# Patient Record
Sex: FEMALE | Race: WHITE | NOT HISPANIC OR LATINO | Employment: FULL TIME | URBAN - METROPOLITAN AREA
[De-identification: names, ages, dates, MRNs, and addresses within clinical notes are randomized per-mention and may not be internally consistent; named-entity substitution may affect disease eponyms.]

---

## 2017-01-10 ENCOUNTER — OFFICE VISIT (OUTPATIENT)
Dept: SURGERY | Facility: CLINIC | Age: 42
End: 2017-01-10
Payer: COMMERCIAL

## 2017-01-10 VITALS
HEART RATE: 69 BPM | TEMPERATURE: 97 F | WEIGHT: 160.06 LBS | DIASTOLIC BLOOD PRESSURE: 79 MMHG | SYSTOLIC BLOOD PRESSURE: 135 MMHG

## 2017-01-10 DIAGNOSIS — K64.5 HEMORRHOIDS, EXTERNAL, THROMBOSED: Primary | ICD-10-CM

## 2017-01-10 PROCEDURE — 99243 OFF/OP CNSLTJ NEW/EST LOW 30: CPT | Mod: S$GLB,,, | Performed by: SURGERY

## 2017-01-10 PROCEDURE — 99999 PR PBB SHADOW E&M-NEW PATIENT-LVL II: CPT | Mod: PBBFAC,,, | Performed by: SURGERY

## 2017-01-10 RX ORDER — BUTALBITAL, ACETAMINOPHEN AND CAFFEINE 50; 325; 40 MG/1; MG/1; MG/1
TABLET ORAL
COMMUNITY
Start: 2016-12-22 | End: 2017-10-10 | Stop reason: SDUPTHER

## 2017-01-10 NOTE — LETTER
January 10, 2017      Brandyn White MD  [address]  [address]  [facility] [address]           [facility]  [address]  [facility] [address]  Phone: [phone]          Patient: Genet Martinez   MR Number: [id number]   YOB: 1975   Date of Visit: 1/10/2017       Dear Dr. Brandyn White:    Thank you for referring Genet Martinez to me for evaluation. Attached you will find relevant portions of my assessment and plan of care.    If you have questions, please do not hesitate to call me. I look forward to following Genet Martinez along with you.    Sincerely,    Sekou Smallwood MD    Enclosure  CC:  No Recipients    If you would like to receive this communication electronically, please contact [email or web address] or [phone] to request more information on [employer] access.    For providers and/or their staff who would like to refer a patient to [facility], please contact us through our one-stop-shop provider referral line, [facility], at [phone].    If you feel you have received this communication in error or would no longer like to receive these types of communications, please e-mail [email or web address]

## 2017-01-10 NOTE — PROGRESS NOTES
Subjective:       Patient ID: Genet Martinez is a 41 y.o. female.    Chief Complaint: Consult (external hemorrhoid)    HPI  Pleasant 40 yo F referred tome in consultation from Dr White for evaluation of hemorhroids.  Pt notes that on CHristmas lyubov she developed a swelling in the perianal area that was extremely painful>  Since that time the swelling has improved as has the tenderness.  She still feels a small nodule and notes some discomfort.  Denies bleeding. No fever/chills. NO abd pain. H as never had this happen before.     Review of Systems   Constitutional: Negative for activity change, appetite change, fever and unexpected weight change.   Respiratory: Negative for chest tightness, shortness of breath and wheezing.    Cardiovascular: Negative for chest pain.   Gastrointestinal: Positive for rectal pain. Negative for abdominal distention, abdominal pain, anal bleeding, blood in stool, constipation, diarrhea, nausea and vomiting.   Genitourinary: Negative for difficulty urinating, dysuria and frequency.   Skin: Negative for wound.   Neurological: Negative for dizziness.   Hematological: Negative for adenopathy.   Psychiatric/Behavioral: Negative for agitation.       Objective:      Physical Exam   Constitutional: She is oriented to person, place, and time. She appears well-developed and well-nourished.   HENT:   Head: Normocephalic and atraumatic.   Eyes: Pupils are equal, round, and reactive to light.   Neck: Normal range of motion. Neck supple. No tracheal deviation present. No thyromegaly present.   Cardiovascular: Normal rate, regular rhythm and normal heart sounds.    No murmur heard.  Pulmonary/Chest: Effort normal and breath sounds normal. She exhibits no tenderness.   Abdominal: Soft. Bowel sounds are normal. She exhibits no distension, no abdominal bruit, no pulsatile midline mass and no mass. There is no hepatosplenomegaly. There is no tenderness. There is no rigidity, no rebound, no guarding,  no tenderness at McBurney's point and negative Biggs's sign. No hernia. Hernia confirmed negative in the ventral area.   Genitourinary: Rectum normal.   Genitourinary Comments: Resolving thrombosis in the R post position     Musculoskeletal: Normal range of motion.   Neurological: She is alert and oriented to person, place, and time.   Skin: Skin is warm. No rash noted. No erythema.   Psychiatric: She has a normal mood and affect.   Vitals reviewed.      Assessment:     Resolving thrombosed hemorrhoid  No diagnosis found.    Plan:       D/w pt. I have informed pt that the hemorrhoid was thrombosed but appears nearly fully resolved.  Would not recommend any intervention at this point.  Instructed pt to RTC should it recur.      Correspondance will be sent to Brandyn White MD

## 2017-06-06 RX ORDER — VORTIOXETINE 10 MG/1
TABLET, FILM COATED ORAL
Qty: 30 TABLET | Refills: 3 | Status: SHIPPED | OUTPATIENT
Start: 2017-06-06 | End: 2017-11-27 | Stop reason: SDUPTHER

## 2017-07-24 ENCOUNTER — TELEPHONE (OUTPATIENT)
Dept: FAMILY MEDICINE | Facility: CLINIC | Age: 42
End: 2017-07-24

## 2017-07-24 RX ORDER — AMITRIPTYLINE HYDROCHLORIDE 10 MG/1
10 TABLET, FILM COATED ORAL NIGHTLY
Qty: 30 TABLET | Refills: 11 | Status: SHIPPED | OUTPATIENT
Start: 2017-07-24 | End: 2018-03-21 | Stop reason: SDUPTHER

## 2017-08-02 ENCOUNTER — TELEPHONE (OUTPATIENT)
Dept: FAMILY MEDICINE | Facility: CLINIC | Age: 42
End: 2017-08-02

## 2017-08-02 NOTE — TELEPHONE ENCOUNTER
Pt was taking Buspirone but stopped due to restless legs so she is taking Elavil and still has a lot of anxiety. Can she restart Trintellix or do you want her to try something else?

## 2017-08-02 NOTE — TELEPHONE ENCOUNTER
She has been on several meds in the past.  Restarting the Trintellix is fine if her insurance will cover it.  Does she need a new rx?  I am also Ok with restarting prozac or lexapro or celexa as well.

## 2017-10-10 RX ORDER — BUTALBITAL, ACETAMINOPHEN AND CAFFEINE 50; 325; 40 MG/1; MG/1; MG/1
TABLET ORAL
Qty: 30 TABLET | Refills: 0 | Status: SHIPPED | OUTPATIENT
Start: 2017-10-10 | End: 2017-10-16 | Stop reason: SDUPTHER

## 2017-10-16 RX ORDER — BUTALBITAL, ACETAMINOPHEN AND CAFFEINE 50; 325; 40 MG/1; MG/1; MG/1
TABLET ORAL
Qty: 30 TABLET | Refills: 0 | Status: SHIPPED | OUTPATIENT
Start: 2017-10-16 | End: 2018-03-21 | Stop reason: SDUPTHER

## 2017-11-06 DIAGNOSIS — K21.9 GASTROESOPHAGEAL REFLUX DISEASE, ESOPHAGITIS PRESENCE NOT SPECIFIED: Primary | ICD-10-CM

## 2017-11-06 RX ORDER — PANTOPRAZOLE SODIUM 40 MG/1
1 TABLET, DELAYED RELEASE ORAL DAILY
COMMUNITY
Start: 2017-03-20 | End: 2017-11-06 | Stop reason: SDUPTHER

## 2017-11-06 RX ORDER — PANTOPRAZOLE SODIUM 40 MG/1
40 TABLET, DELAYED RELEASE ORAL DAILY
Qty: 30 TABLET | Refills: 0 | Status: SHIPPED | OUTPATIENT
Start: 2017-11-06 | End: 2017-12-05 | Stop reason: SDUPTHER

## 2017-11-27 RX ORDER — VORTIOXETINE 10 MG/1
TABLET, FILM COATED ORAL
Qty: 30 TABLET | Refills: 2 | Status: SHIPPED | OUTPATIENT
Start: 2017-11-27 | End: 2018-02-23 | Stop reason: SDUPTHER

## 2017-12-05 DIAGNOSIS — K21.9 GASTROESOPHAGEAL REFLUX DISEASE, ESOPHAGITIS PRESENCE NOT SPECIFIED: ICD-10-CM

## 2017-12-05 RX ORDER — PANTOPRAZOLE SODIUM 40 MG/1
40 TABLET, DELAYED RELEASE ORAL DAILY
Qty: 30 TABLET | Refills: 1 | Status: SHIPPED | OUTPATIENT
Start: 2017-12-05 | End: 2018-02-09 | Stop reason: SDUPTHER

## 2017-12-19 RX ORDER — BUTALBITAL, ACETAMINOPHEN AND CAFFEINE 50; 325; 40 MG/1; MG/1; MG/1
TABLET ORAL
Qty: 30 TABLET | Refills: 0 | OUTPATIENT
Start: 2017-12-19

## 2018-02-09 DIAGNOSIS — K21.9 GASTROESOPHAGEAL REFLUX DISEASE, ESOPHAGITIS PRESENCE NOT SPECIFIED: ICD-10-CM

## 2018-02-09 RX ORDER — PANTOPRAZOLE SODIUM 40 MG/1
TABLET, DELAYED RELEASE ORAL
Qty: 30 TABLET | Refills: 0 | Status: SHIPPED | OUTPATIENT
Start: 2018-02-09 | End: 2018-03-14 | Stop reason: SDUPTHER

## 2018-02-23 RX ORDER — VORTIOXETINE 10 MG/1
TABLET, FILM COATED ORAL
Qty: 30 TABLET | Refills: 1 | Status: SHIPPED | OUTPATIENT
Start: 2018-02-23 | End: 2018-03-21 | Stop reason: SDUPTHER

## 2018-03-14 RX ORDER — PANTOPRAZOLE SODIUM 40 MG/1
TABLET, DELAYED RELEASE ORAL
Qty: 30 TABLET | Refills: 0 | Status: SHIPPED | OUTPATIENT
Start: 2018-03-14 | End: 2018-03-21 | Stop reason: SDUPTHER

## 2018-03-21 ENCOUNTER — OFFICE VISIT (OUTPATIENT)
Dept: FAMILY MEDICINE | Facility: CLINIC | Age: 43
End: 2018-03-21
Payer: COMMERCIAL

## 2018-03-21 VITALS
BODY MASS INDEX: 29.19 KG/M2 | DIASTOLIC BLOOD PRESSURE: 70 MMHG | HEIGHT: 64 IN | WEIGHT: 171 LBS | HEART RATE: 69 BPM | SYSTOLIC BLOOD PRESSURE: 120 MMHG | OXYGEN SATURATION: 98 %

## 2018-03-21 DIAGNOSIS — Z00.00 HEALTHCARE MAINTENANCE: ICD-10-CM

## 2018-03-21 DIAGNOSIS — G43.109 MIGRAINE WITH AURA AND WITHOUT STATUS MIGRAINOSUS, NOT INTRACTABLE: ICD-10-CM

## 2018-03-21 DIAGNOSIS — F41.9 ANXIETY: Primary | ICD-10-CM

## 2018-03-21 DIAGNOSIS — K21.9 GASTRIC REFLUX: ICD-10-CM

## 2018-03-21 PROBLEM — R11.0 NAUSEA: Status: ACTIVE | Noted: 2018-03-21

## 2018-03-21 PROBLEM — Z78.9 NON-SMOKER: Status: ACTIVE | Noted: 2018-03-21

## 2018-03-21 PROBLEM — J30.9 ALLERGIC RHINITIS: Status: ACTIVE | Noted: 2018-03-21

## 2018-03-21 PROCEDURE — 99213 OFFICE O/P EST LOW 20 MIN: CPT | Mod: ,,, | Performed by: NURSE PRACTITIONER

## 2018-03-21 RX ORDER — AMITRIPTYLINE HYDROCHLORIDE 10 MG/1
10 TABLET, FILM COATED ORAL NIGHTLY
Qty: 90 TABLET | Refills: 1 | Status: SHIPPED | OUTPATIENT
Start: 2018-03-21 | End: 2018-09-28 | Stop reason: SDUPTHER

## 2018-03-21 RX ORDER — MULTIVITAMIN
TABLET ORAL
COMMUNITY
End: 2021-07-22

## 2018-03-21 RX ORDER — PANTOPRAZOLE SODIUM 40 MG/1
40 TABLET, DELAYED RELEASE ORAL DAILY
Qty: 90 TABLET | Refills: 1 | Status: SHIPPED | OUTPATIENT
Start: 2018-03-21 | End: 2018-09-28 | Stop reason: SDUPTHER

## 2018-03-21 RX ORDER — BUTALBITAL, ACETAMINOPHEN AND CAFFEINE 50; 325; 40 MG/1; MG/1; MG/1
1 TABLET ORAL 2 TIMES DAILY PRN
Qty: 30 TABLET | Refills: 1 | Status: SHIPPED | OUTPATIENT
Start: 2018-03-21 | End: 2018-03-21 | Stop reason: SDUPTHER

## 2018-03-21 RX ORDER — BUTALBITAL, ACETAMINOPHEN AND CAFFEINE 50; 325; 40 MG/1; MG/1; MG/1
1 TABLET ORAL 2 TIMES DAILY PRN
Qty: 30 TABLET | Refills: 1 | Status: SHIPPED | OUTPATIENT
Start: 2018-03-21 | End: 2020-08-24 | Stop reason: SDUPTHER

## 2018-03-21 RX ORDER — ALPRAZOLAM 0.25 MG/1
0.25 TABLET ORAL DAILY PRN
Qty: 30 TABLET | Refills: 1 | Status: SHIPPED | OUTPATIENT
Start: 2018-03-21 | End: 2020-01-20 | Stop reason: SDUPTHER

## 2018-03-21 NOTE — PATIENT INSTRUCTIONS
Anxiety Reaction  Anxiety is the feeling we all get when we think something bad might happen. It is a normal response to stress and usually causes only a mild reaction. When anxiety becomes more severe, it can interfere with daily life. In some cases, you may not even be aware of what it is youre anxious about. There may also be a genetic link or it may be a learned behavior in the home.  Both psychological and physical triggers cause stress reaction. It's often a response to fear or emotional stress, real or imagined. This stress may come from home, family, work, or social relationships.  During an anxiety reaction, you may feel:  · Helpless  · Nervous  · Depressed  · Irritable  Your body may show signs of anxiety in many ways. You may experience:  · Dry mouth  · Shakiness  · Dizziness  · Weakness  · Trouble breathing  · Breathing fast (hyperventilating)  · Chest pressure  · Sweating  · Headache  · Nausea  · Diarrhea  · Tiredness  · Inability to sleep  · Sexual problems  Home care  · Try to locate the sources of stress in your life. They may not be obvious. These may include:  ¨ Daily hassles of life (traffic jams, missed appointments, car troubles, etc.)  ¨ Major life changes, both good (new baby, job promotion) and bad (loss of job, loss of loved one)  ¨ Overload: feeling that you have too many responsibilities and can't take care of all of them at once  ¨ Feeling helpless, feeling that your problems are beyond what youre able to solve  · Notice how your body reacts to stress. Learn to listen to your body signals. This will help you take action before the stress becomes severe.  · When you can, do something about the source of your stress. (Avoid hassles, limit the amount of change that happens in your life at one time and take a break when you feel overloaded).  · Unfortunately, many stressful situations can't be avoided. It is necessary to learn how to better manage stress. There are many proven methods  that will reduce your anxiety. These include simple things like exercise, good nutrition and adequate rest. Also, there are certain techniques that are helpful:  ¨ Relaxation  ¨ Breathing exercises  ¨ Visualization  ¨ Biofeedback  ¨ Meditation  For more information about this, consult your doctor or go to a local bookstore and review the many books and tapes available on this subject.  Follow-up care  If you feel that your anxiety is not responding to self-help measures, contact your doctor or make an appointment with a counselor. You may need short-term psychological counseling and temporary medicine to help you manage stress.  Call 911  Call your healthcare provider right away if any of these occur:  · Trouble breathing  · Confusion  · Drowsiness or trouble wakening  · Fainting or loss of consciousness  · Rapid heart rate  · Seizure  · New chest pain that becomes more severe, lasts longer, or spreads into your shoulder, arm, neck, jaw, or back  When to seek medical advice  Call your healthcare provider right away if any of these occur:  · Your symptoms get worse  · Severe headache not relieved by rest and mild pain reliever  Date Last Reviewed: 9/29/2015  © 5870-8217 [employer]. [address] [place] [address]. All rights reserved. This information is not intended as a substitute for professional medical care. Always follow your healthcare professional's instructions.        4 Steps for Eating Healthier  Changing the way you eat can improve your health. It can lower your cholesterol and blood pressure, and help you stay at a healthy weight. Your diet doesnt have to be bland and boring to be healthy. Just watch your calories and follow these steps:    1. Eat fewer unhealthy fats  · Choose more fish and lean meats instead of fatty cuts of meat.  · Skip butter and lard, and use less margarine.  · Pass on foods that have palm, coconut, or hydrogenated oils.  · Eat fewer high-fat dairy foods  like cheese, ice cream, and whole milk.  · Get a heart-healthy cookbook and try some low-fat recipes.  2. Go light on salt  · Keep the saltshaker off the table.  · Limit high-salt ingredients, such as soy sauce, bouillon, and garlic salt.  · Instead of adding salt when cooking, season your food with herbs and flavorings. Try lemon, garlic, and onion.  · Limit convenience foods, such as boxed or canned foods and restaurant food.  · Read food labels and choose lower-sodium options.  3. Limit sugar  · Pause before you add sugars to pancakes, cereal, coffee, or tea. This includes white and brown table sugar, syrup, honey, and molasses. Cut your usual amount by half.  · Use non-sugar sweeteners. Stevia, aspartame, and sucralose can satisfy a sweet tooth without adding calories.  · Swap out sugar-filled soda and other drinks. Buy sugar-free or low-calorie beverages. Remember water is always the best choice.  · Read labels and choose foods with less added sugar. Keep in mind that dairy foods and foods with fruit will have some natural sugar.  · Cut the sugar in recipes by 1/3 to 1/2. Boost the flavor with extracts like almond, vanilla, or orange. Or add spices such as cinnamon or nutmeg.  4. Eat more fiber  · Eat fresh fruits and vegetables every day.  · Boost your diet with whole grains. Go for oats, whole-grain rice, and bran.  · Add beans and lentils to your meals.  · Drink more water to match your fiber increase. This is to help prevent constipation.  Date Last Reviewed: 5/11/2015  © 8359-1308 [employer]. [address] [place] [address], [place] [address]. All rights reserved. This information is not intended as a substitute for professional medical care. Always follow your healthcare professional's instructions.        Medicines for Acid Reflux  Your healthcare provider has told you that you have acid reflux. This condition causes stomach acid to wash up into your throat. For most people, acid reflux is  troubling but not dangerous. But left untreated, acid reflux sometimes damages the esophagus. Medicines can help control acid reflux and limit your risk of future problems.  Medicines for acid reflux  Your healthcare provider may prescribe medicine to help treat your acid reflux. Medicine will be based on your symptoms and any test results. Your provider will explain how to take your medicine. You will also be told about possible side effects.  Reducing stomach acid  Your provider may suggest antacids that you can buy over the counter. Antacids can give fast relief. Or you may be told to take a type of medicine called H2 blockers. These are available over the counter and by prescription (for higher doses).  Blocking stomach acid  In more severe cases, your healthcare provider may suggest stronger medicines such as proton pump inhibitors (PPIs). These keep the stomach from making acid. They are often prescribed for long-term use.  Other medicines  In some cases medicines to reduce or block stomach acid may not work. Then you may be switched to another type of medicine that helps your stomach empty better.     Date Last Reviewed: 10/1/2016  © 2010-1310 [employer]. [address] [place] [address], [place] [address]. All rights reserved. This information is not intended as a substitute for professional medical care. Always follow your healthcare professional's instructions.

## 2018-03-21 NOTE — PROGRESS NOTES
SUBJECTIVE:      Patient ID: Genet Martinez is a 42 y.o. female.    Chief Complaint: Anxiety (rx refill)    Genet is here today for follow up for anxiety and depression, gastric reflux and migraine headaches.      Anxiety   Presents for follow-up visit. Patient reports no chest pain, compulsions, confusion, decreased concentration, depressed mood, dizziness, dry mouth, excessive worry, feeling of choking, hyperventilation, insomnia, irritability, malaise, muscle tension, nausea, nervous/anxious behavior, obsessions, palpitations, panic, restlessness, shortness of breath or suicidal ideas. Primary symptoms comment: symptoms controlled on current medications. Symptoms occur rarely. The severity of symptoms is moderate. The quality of sleep is good. Nighttime awakenings: occasional.       Heartburn   She complains of heartburn. She reports no abdominal pain, no belching, no chest pain, no choking, no coughing, no dysphagia, no early satiety, no globus sensation, no hoarse voice, no nausea, no sore throat, no stridor, no tooth decay, no water brash or no wheezing. symptoms controlled on current medications. This is a chronic problem. The current episode started more than 1 year ago. The problem occurs occasionally (controlled on protonix). The symptoms are aggravated by certain foods. Pertinent negatives include no anemia, fatigue, melena, muscle weakness, orthopnea or weight loss. She has tried a PPI for the symptoms. The treatment provided significant relief.   Migraine    This is a chronic problem. The problem occurs intermittently. The problem has been waxing and waning. The pain is located in the bilateral region. The pain quality is similar to prior headaches. The quality of the pain is described as band-like, boring and throbbing. The pain is severe. Associated symptoms include phonophobia, photophobia (with headaches) and scalp tenderness. Pertinent negatives include no abdominal pain, abnormal  behavior, anorexia, back pain, blurred vision, coughing, dizziness, drainage, ear pain, eye pain, eye redness, eye watering, facial sweating, fever, hearing loss, insomnia, loss of balance, muscle aches, nausea, neck pain, numbness, rhinorrhea, sore throat, visual change, vomiting, weakness or weight loss. The symptoms are aggravated by bright light. She has tried antidepressants, Excedrin and NSAIDs for the symptoms. The treatment provided mild (fioricet works the best) relief. Her past medical history is significant for migraine headaches and migraines in the family.       Past Surgical History:   Procedure Laterality Date     SECTION      TUBAL LIGATION       Family History   Problem Relation Age of Onset    Hypertension Father     Hyperlipidemia Father       Social History     Social History    Marital status:      Spouse name: N/A    Number of children: N/A    Years of education: N/A     Social History Main Topics    Smoking status: Never Smoker    Smokeless tobacco: Never Used    Alcohol use No    Drug use: No    Sexual activity: Not Asked     Other Topics Concern    None     Social History Narrative    None     Current Outpatient Prescriptions   Medication Sig Dispense Refill    amitriptyline (ELAVIL) 10 MG tablet Take 1 tablet (10 mg total) by mouth every evening. 90 tablet 1    butalbital-acetaminophen-caffeine -40 mg (FIORICET, ESGIC) -40 mg per tablet Take 1 tablet by mouth 2 (two) times daily as needed for Headaches. 30 tablet 1    multivitamin (THERAGRAN) per tablet Take by mouth.      pantoprazole (PROTONIX) 40 MG tablet Take 1 tablet (40 mg total) by mouth once daily. 90 tablet 1    vortioxetine (TRINTELLIX) 10 mg Tab Take 1 tablet (10 mg total) by mouth once daily. 90 tablet 1    ALPRAZolam (XANAX) 0.25 MG tablet Take 1 tablet (0.25 mg total) by mouth daily as needed for Anxiety. 30 tablet 1    ranitidine (ZANTAC) 150 MG tablet Take 1 tablet (150 mg  total) by mouth 2 (two) times daily. 60 tablet 5     No current facility-administered medications for this visit.      Review of patient's allergies indicates:   Allergen Reactions    Doxycycline     Penicillins Hives    Sulfa (sulfonamide antibiotics)     Telithromycin       History reviewed. No pertinent past medical history.  Past Surgical History:   Procedure Laterality Date     SECTION      TUBAL LIGATION         Review of Systems   Constitutional: Negative for activity change, appetite change, chills, diaphoresis, fatigue, fever, irritability, unexpected weight change and weight loss.   HENT: Negative for congestion, ear pain, hearing loss, hoarse voice, rhinorrhea, sore throat and voice change.    Eyes: Positive for photophobia (with headaches). Negative for blurred vision, pain and redness.   Respiratory: Negative for cough, choking, shortness of breath and wheezing.    Cardiovascular: Negative for chest pain and palpitations.   Gastrointestinal: Positive for heartburn. Negative for abdominal pain, anorexia, constipation, diarrhea, dysphagia, melena, nausea and vomiting.   Endocrine: Negative for polydipsia, polyphagia and polyuria.   Genitourinary: Negative for dysuria, frequency and urgency.   Musculoskeletal: Negative for arthralgias, back pain, gait problem, myalgias, muscle weakness and neck pain.   Skin: Negative for color change, pallor and rash.   Allergic/Immunologic: Negative for immunocompromised state.   Neurological: Negative for dizziness, syncope, weakness, numbness, headaches and loss of balance.   Hematological: Negative for adenopathy. Does not bruise/bleed easily.   Psychiatric/Behavioral: Negative for confusion, decreased concentration, dysphoric mood, self-injury, sleep disturbance and suicidal ideas. The patient is not nervous/anxious and does not have insomnia.       OBJECTIVE:      Vitals:    18 1104   BP: 120/70   Pulse: 69   SpO2: 98%   Weight: 77.6 kg (171 lb)  "  Height: 5' 4" (1.626 m)     Physical Exam   Constitutional: She is oriented to person, place, and time. She appears well-developed and well-nourished. No distress.   HENT:   Head: Normocephalic and atraumatic.   Right Ear: Tympanic membrane, external ear and ear canal normal.   Left Ear: Tympanic membrane, external ear and ear canal normal.   Nose: Nose normal. Right sinus exhibits no maxillary sinus tenderness and no frontal sinus tenderness. Left sinus exhibits no maxillary sinus tenderness and no frontal sinus tenderness.   Mouth/Throat: Uvula is midline and oropharynx is clear and moist. No oropharyngeal exudate, posterior oropharyngeal edema or posterior oropharyngeal erythema.   Eyes: Conjunctivae, EOM and lids are normal. Pupils are equal, round, and reactive to light. Right eye exhibits no discharge. Left eye exhibits no discharge. No scleral icterus. Right eye exhibits normal extraocular motion and no nystagmus. Left eye exhibits normal extraocular motion and no nystagmus.   Neck: Normal range of motion. Neck supple. Carotid bruit is not present. No tracheal deviation present. No thyromegaly present.   Cardiovascular: Normal rate, regular rhythm, normal heart sounds and intact distal pulses.  Exam reveals no gallop and no friction rub.    No murmur heard.  Pulmonary/Chest: Effort normal and breath sounds normal. No respiratory distress. She has no wheezes. She has no rales.   Abdominal: Soft. Bowel sounds are normal. She exhibits no distension and no mass. There is no tenderness. There is no rebound and no guarding.   Musculoskeletal: Normal range of motion. She exhibits no edema or tenderness.   Lymphadenopathy:     She has no cervical adenopathy.   Neurological: She is alert and oriented to person, place, and time. Coordination normal.   Skin: Skin is warm, dry and intact. She is not diaphoretic.   Psychiatric: She has a normal mood and affect. Her behavior is normal. Judgment and thought content " normal. She expresses no suicidal plans.   Vitals reviewed.     Assessment:       1. Anxiety    2. Gastric reflux    3. Migraine with aura and without status migrainosus, not intractable    4. Healthcare maintenance        Plan:       Anxiety   Stable; continue current medications.  -     amitriptyline (ELAVIL) 10 MG tablet; Take 1 tablet (10 mg total) by mouth every evening.  Dispense: 90 tablet; Refill: 1  -     vortioxetine (TRINTELLIX) 10 mg Tab; Take 1 tablet (10 mg total) by mouth once daily.  Dispense: 90 tablet; Refill: 1  -     ALPRAZolam (XANAX) 0.25 MG tablet; Take 1 tablet (0.25 mg total) by mouth daily as needed for Anxiety.  Dispense: 30 tablet; Refill: 1    Gastric reflux   Potential long term complications of PPI use discussed with pt.  She is willing to try zantac; also recommend daily calcium and vitamin D supplement and follow up with GI for eval; understanding voiced.  -     pantoprazole (PROTONIX) 40 MG tablet; Take 1 tablet (40 mg total) by mouth once daily.  Dispense: 90 tablet; Refill: 1  -     ranitidine (ZANTAC) 150 MG tablet; Take 1 tablet (150 mg total) by mouth 2 (two) times daily.  Dispense: 60 tablet; Refill: 5    Migraine with aura and without status migrainosus, not intractable   Stable; continue current medications.  -     butalbital-acetaminophen-caffeine -40 mg (FIORICET, ESGIC) -40 mg per tablet; Take 1 tablet by mouth 2 (two) times daily as needed for Headaches.  Dispense: 30 tablet; Refill: 1    Healthcare maintenance  -     CBC auto differential; Future; Expected date: 03/21/2018  -     Comprehensive metabolic panel; Future; Expected date: 03/21/2018  -     Lipid panel; Future; Expected date: 03/21/2018  -     TSH; Future; Expected date: 03/21/2018  -     Urinalysis; Future    Follow-up in about 6 months (around 9/21/2018) for med refill.      3/21/2018 Georgia Troncoso, SAADIA, FNP

## 2018-03-27 LAB
ALBUMIN SERPL-MCNC: 4.2 G/DL (ref 3.1–4.7)
ALP SERPL-CCNC: 62 IU/L (ref 40–104)
ALT (SGPT): 19 IU/L (ref 3–33)
AST SERPL-CCNC: 20 IU/L (ref 10–40)
BASOPHILS NFR BLD: 0.1 K/UL (ref 0–0.2)
BASOPHILS NFR BLD: 0.8 %
BILIRUB SERPL-MCNC: 0.6 MG/DL (ref 0.3–1)
BUN SERPL-MCNC: 15 MG/DL (ref 8–20)
CALCIUM SERPL-MCNC: 9.3 MG/DL (ref 7.7–10.4)
CHLORIDE: 106 MMOL/L (ref 98–110)
CO2 SERPL-SCNC: 25.9 MMOL/L (ref 22.8–31.6)
CREATININE: 0.86 MG/DL (ref 0.6–1.4)
EOSINOPHIL NFR BLD: 0.2 K/UL (ref 0–0.7)
EOSINOPHIL NFR BLD: 2.8 %
ERYTHROCYTE [DISTWIDTH] IN BLOOD BY AUTOMATED COUNT: 13.1 % (ref 11.7–14.9)
GLUCOSE: 103 MG/DL (ref 70–99)
GRAN #: 4.8 K/UL (ref 1.4–6.5)
GRAN%: 64.5 %
HCT VFR BLD AUTO: 42.8 % (ref 36–48)
HGB BLD-MCNC: 13.9 G/DL (ref 12–15)
IMMATURE GRANS (ABS): 0 K/UL (ref 0–1)
IMMATURE GRANULOCYTES: 0.4 %
LYMPH #: 1.7 K/UL (ref 1.2–3.4)
LYMPH%: 23 %
MCH RBC QN AUTO: 28.7 PG (ref 25–35)
MCHC RBC AUTO-ENTMCNC: 32.5 G/DL (ref 31–36)
MCV RBC AUTO: 88.2 FL (ref 79–98)
MONO #: 0.6 K/UL (ref 0.1–0.6)
MONO%: 8.5 %
NUCLEATED RBCS: 0 %
PLATELET # BLD AUTO: 297 K/UL (ref 140–440)
PMV BLD AUTO: 11 FL (ref 8.8–12.7)
POTASSIUM SERPL-SCNC: 4.1 MMOL/L (ref 3.5–5)
PROT SERPL-MCNC: 7.3 G/DL (ref 6–8.2)
RBC # BLD AUTO: 4.85 M/UL (ref 3.5–5.5)
SODIUM: 139 MMOL/L (ref 134–144)
TSH SERPL DL<=0.005 MIU/L-ACNC: 1.3 ULU/ML (ref 0.3–5.6)
WBC # BLD AUTO: 7.4 K/UL (ref 5–10)

## 2018-03-28 ENCOUNTER — TELEPHONE (OUTPATIENT)
Dept: FAMILY MEDICINE | Facility: CLINIC | Age: 43
End: 2018-03-28

## 2018-03-28 DIAGNOSIS — R82.90 ABNORMAL URINALYSIS: ICD-10-CM

## 2018-03-28 DIAGNOSIS — R82.71 BACTERIA IN URINE: Primary | ICD-10-CM

## 2018-03-28 NOTE — TELEPHONE ENCOUNTER
----- Message from WILVER Castillo sent at 3/28/2018  9:07 AM CDT -----  Symptoms of UTI? Urine positive for blood, WBCs and bacteria.  LDL (bad) chol and fasting glucose are a little elevated; High fiber, low fat diet, daily aerobic exercise and daily metamucil supplement to help lower cholesterol.  Watch sugar in diet as well.

## 2018-04-20 ENCOUNTER — TELEPHONE (OUTPATIENT)
Dept: FAMILY MEDICINE | Facility: CLINIC | Age: 43
End: 2018-04-20

## 2018-09-11 ENCOUNTER — TELEPHONE (OUTPATIENT)
Dept: FAMILY MEDICINE | Facility: CLINIC | Age: 43
End: 2018-09-11

## 2018-09-11 DIAGNOSIS — R73.9 HYPERGLYCEMIA: ICD-10-CM

## 2018-09-11 DIAGNOSIS — E78.5 DYSLIPIDEMIA: Primary | ICD-10-CM

## 2018-09-11 NOTE — TELEPHONE ENCOUNTER
----- Message from Sandra Sterling sent at 2018 11:42 AM CDT -----  Regarding: LAB ORDER REQUEST  Contact: [phone]  Patient has appointment on 18 and is requesting lab orders be sent to [facility].  : 1975  Thank you!

## 2018-09-25 LAB
ALBUMIN SERPL-MCNC: 4.1 G/DL (ref 3.1–4.7)
ALP SERPL-CCNC: 54 IU/L (ref 40–104)
ALT (SGPT): 13 IU/L (ref 3–33)
AST SERPL-CCNC: 14 IU/L (ref 10–40)
BILIRUB SERPL-MCNC: 0.7 MG/DL (ref 0.3–1)
BUN SERPL-MCNC: 17 MG/DL (ref 8–20)
CALCIUM SERPL-MCNC: 9.1 MG/DL (ref 7.7–10.4)
CHLORIDE: 104 MMOL/L (ref 98–110)
CO2 SERPL-SCNC: 24.1 MMOL/L (ref 22.8–31.6)
CREATININE: 1.02 MG/DL (ref 0.6–1.4)
GLUCOSE: 103 MG/DL (ref 70–99)
HBA1C MFR BLD: 5.5 % (ref 3.1–6.5)
POTASSIUM SERPL-SCNC: 3.9 MMOL/L (ref 3.5–5)
PROT SERPL-MCNC: 7.3 G/DL (ref 6–8.2)
SODIUM: 138 MMOL/L (ref 134–144)

## 2018-09-28 ENCOUNTER — OFFICE VISIT (OUTPATIENT)
Dept: FAMILY MEDICINE | Facility: CLINIC | Age: 43
End: 2018-09-28
Payer: COMMERCIAL

## 2018-09-28 VITALS
WEIGHT: 166.13 LBS | HEIGHT: 64 IN | RESPIRATION RATE: 18 BRPM | DIASTOLIC BLOOD PRESSURE: 70 MMHG | HEART RATE: 68 BPM | BODY MASS INDEX: 28.36 KG/M2 | OXYGEN SATURATION: 98 % | SYSTOLIC BLOOD PRESSURE: 118 MMHG

## 2018-09-28 DIAGNOSIS — E78.5 DYSLIPIDEMIA: ICD-10-CM

## 2018-09-28 DIAGNOSIS — F41.9 ANXIETY: Primary | ICD-10-CM

## 2018-09-28 DIAGNOSIS — K21.9 GASTRIC REFLUX: ICD-10-CM

## 2018-09-28 DIAGNOSIS — L65.9 HAIR LOSS: ICD-10-CM

## 2018-09-28 PROCEDURE — 99214 OFFICE O/P EST MOD 30 MIN: CPT | Mod: ,,, | Performed by: NURSE PRACTITIONER

## 2018-09-28 PROCEDURE — 3008F BODY MASS INDEX DOCD: CPT | Mod: ,,, | Performed by: NURSE PRACTITIONER

## 2018-09-28 RX ORDER — PANTOPRAZOLE SODIUM 40 MG/1
40 TABLET, DELAYED RELEASE ORAL DAILY
Qty: 90 TABLET | Refills: 1 | Status: SHIPPED | OUTPATIENT
Start: 2018-09-28 | End: 2020-01-20 | Stop reason: SDUPTHER

## 2018-09-28 RX ORDER — TRETINOIN 0.05 G/100G
1 GEL TOPICAL NIGHTLY
COMMUNITY
Start: 2018-08-28 | End: 2021-07-22

## 2018-09-28 RX ORDER — AMITRIPTYLINE HYDROCHLORIDE 10 MG/1
10 TABLET, FILM COATED ORAL NIGHTLY
Qty: 90 TABLET | Refills: 1 | Status: SHIPPED | OUTPATIENT
Start: 2018-09-28 | End: 2020-01-20 | Stop reason: SDUPTHER

## 2018-09-28 NOTE — PATIENT INSTRUCTIONS
Eating Heart-Healthy Food: Using the DASH Plan    Eating for your heart doesnt have to be hard or boring. You just need to know how to make healthier choices. The DASH eating plan has been developed to help you do just that. DASH stands for Dietary Approaches to Stop Hypertension. It is a plan that has been proven to be healthier for your heart and to lower your risk for high blood pressure. It can also help lower your risk for cancer, heart disease, osteoporosis, and diabetes.  Choosing from each food group  Choose foods from each of the food groups below each day. Try to get the recommended number of servings for each food group. The serving numbers are based on a diet of 2,000 calories a day. Talk to your doctor if youre unsure about your calorie needs. Along with getting the correct servings, the DASH plan also recommends a sodium intake less than 2,300 mg per day.        Grains  Servings: 6 to 8 a day  A serving is:  · 1 slice bread  · 1 ounce dry cereal  · Half a cup cooked rice, pasta or cereal  Best choices: Whole grains and any grains high in fiber. Vegetables  Servings: 4 to 5 a day  A serving is:  · 1 cup raw leafy vegetable  · Half a cup cut-up raw or cooked vegetable  · Half a cup vegetable juice  Best choices: Fresh or frozen vegetables prepared without added salt or fat.   Fruits  Servings: 4 to 5 a day  A serving is:  · 1 medium fruit  · One-quarter cup dried fruit  · Half a cup fresh, frozen, or canned fruit  · Half a cup of 100% fruit juices  Best choices: A variety of fresh fruits of different colors. Whole fruits are a better choice than fruit juices. Low-fat or fat-free dairy  Servings: 2 to 3 a day  A serving is:  · 1 cup milk  · 1 cup yogurt  · One and a half ounces cheese  Best choices: Skim or 1% milk, low-fat or fat-free yogurt or buttermilk, and low-fat cheeses.         Lean meats, poultry, fish  Servings: 6 or fewer a day  A serving is:  · 1 ounce cooked meats, poultry, or fish  · 1  egg  Best choices: Lean poultry and fish. Trim away visible fat. Broil, grill, roast, or boil instead of frying. Remove skin from poultry before eating. Limit how much red meat you eat.  Nuts, seeds, beans  Servings: 4 to 5 a week  A serving is:  · One-third cup nuts (one and a half ounces)  · 2 tablespoons nut butter or seeds  · Half a cup cooked dry beans or legumes  Best choices: Dry roasted nuts with no salt added, lentils, kidney beans, garbanzo beans, and whole aragon beans.   Fats and oils  Servings: 2 to 3 a day  A serving is:  · 1 teaspoon vegetable oil  · 1 teaspoon soft margarine  · 1 tablespoon mayonnaise  · 2 tablespoons salad dressing  Best choices: Nut and vegetable oils (nontropical vegetable oils), such as olive and canola oil. Sweets  Servings: 5 a week or fewer  A serving is:  · 1 tablespoon sugar, maple syrup, or honey  · 1 tablespoon jam or jelly  · 1 half-ounce jelly beans (about 15)  · 1 cup lemonade  Best choices: Dried fruit can be a satisfying sweet. Choose low-fat sweets. And watch your serving sizes!      For more on the DASH eating plan, visit:  www.nhlbi.nih.gov/health/health-topics/topics/dash   Date Last Reviewed: 6/1/2016  © 7634-1125 [employer]. [address] [place] [address], [place] [address]. All rights reserved. This information is not intended as a substitute for professional medical care. Always follow your healthcare professional's instructions.        Anxiety Reaction  Anxiety is the feeling we all get when we think something bad might happen. It is a normal response to stress and usually causes only a mild reaction. When anxiety becomes more severe, it can interfere with daily life. In some cases, you may not even be aware of what it is youre anxious about. There may also be a genetic link or it may be a learned behavior in the home.  Both psychological and physical triggers cause stress reaction. It's often a response to fear or emotional stress, real or imagined.  This stress may come from home, family, work, or social relationships.  During an anxiety reaction, you may feel:  · Helpless  · Nervous  · Depressed  · Irritable  Your body may show signs of anxiety in many ways. You may experience:  · Dry mouth  · Shakiness  · Dizziness  · Weakness  · Trouble breathing  · Breathing fast (hyperventilating)  · Chest pressure  · Sweating  · Headache  · Nausea  · Diarrhea  · Tiredness  · Inability to sleep  · Sexual problems  Home care  · Try to locate the sources of stress in your life. They may not be obvious. These may include:  ¨ Daily hassles of life (traffic jams, missed appointments, car troubles, etc.)  ¨ Major life changes, both good (new baby, job promotion) and bad (loss of job, loss of loved one)  ¨ Overload: feeling that you have too many responsibilities and can't take care of all of them at once  ¨ Feeling helpless, feeling that your problems are beyond what youre able to solve  · Notice how your body reacts to stress. Learn to listen to your body signals. This will help you take action before the stress becomes severe.  · When you can, do something about the source of your stress. (Avoid hassles, limit the amount of change that happens in your life at one time and take a break when you feel overloaded).  · Unfortunately, many stressful situations can't be avoided. It is necessary to learn how to better manage stress. There are many proven methods that will reduce your anxiety. These include simple things like exercise, good nutrition and adequate rest. Also, there are certain techniques that are helpful:  ¨ Relaxation  ¨ Breathing exercises  ¨ Visualization  ¨ Biofeedback  ¨ Meditation  For more information about this, consult your doctor or go to a local bookstore and review the many books and tapes available on this subject.  Follow-up care  If you feel that your anxiety is not responding to self-help measures, contact your doctor or make an appointment with a  counselor. You may need short-term psychological counseling and temporary medicine to help you manage stress.  Call 911  Call your healthcare provider right away if any of these occur:  · Trouble breathing  · Confusion  · Drowsiness or trouble wakening  · Fainting or loss of consciousness  · Rapid heart rate  · Seizure  · New chest pain that becomes more severe, lasts longer, or spreads into your shoulder, arm, neck, jaw, or back  When to seek medical advice  Call your healthcare provider right away if any of these occur:  · Your symptoms get worse  · Severe headache not relieved by rest and mild pain reliever  Date Last Reviewed: 9/29/2015  © 3783-5923 [employer].. [address] [place] [address], [place] [address]. All rights reserved. This information is not intended as a substitute for professional medical care. Always follow your healthcare professional's instructions.

## 2018-09-28 NOTE — PROGRESS NOTES
SUBJECTIVE:      Patient ID: Genet Martinez is a 42 y.o. female.    Chief Complaint: Anxiety    Genet is here today for routine follow up for anxiety.  She reports that it is worse around her monthly menstrual cycle.  She states that in the past she was not very regular, but lately she has had a period every month and her anxiety is increased prior to the start of menses.  She does xanax sparingly and states she does not need an rx for that right now.    She does have c/o hair loss as well.  She states she noticed it about 6 months ago.  It is not severe but states that her hair is thinner than it used to be.  TSH was normal in March.      Anxiety   Presents for follow-up visit. Symptoms include decreased concentration, depressed mood, excessive worry, irritability, malaise, muscle tension and nervous/anxious behavior. Patient reports no chest pain, compulsions, confusion, dizziness, dry mouth, feeling of choking, nausea, obsessions, palpitations, panic, restlessness, shortness of breath or suicidal ideas. Symptoms occur occasionally. The severity of symptoms is moderate. The quality of sleep is good (with elavil). Nighttime awakenings: occasional.           Past Surgical History:   Procedure Laterality Date     SECTION      TUBAL LIGATION       Family History   Problem Relation Age of Onset    Hypertension Father     Hyperlipidemia Father       Social History     Socioeconomic History    Marital status:      Spouse name: None    Number of children: None    Years of education: None    Highest education level: None   Social Needs    Financial resource strain: None    Food insecurity - worry: None    Food insecurity - inability: None    Transportation needs - medical: None    Transportation needs - non-medical: None   Occupational History    None   Tobacco Use    Smoking status: Never Smoker    Smokeless tobacco: Never Used   Substance and Sexual Activity    Alcohol use: No     Drug use: No    Sexual activity: None   Other Topics Concern    None   Social History Narrative    None     Current Outpatient Medications   Medication Sig Dispense Refill    ALPRAZolam (XANAX) 0.25 MG tablet Take 1 tablet (0.25 mg total) by mouth daily as needed for Anxiety. 30 tablet 1    amitriptyline (ELAVIL) 10 MG tablet Take 1 tablet (10 mg total) by mouth every evening. 90 tablet 1    butalbital-acetaminophen-caffeine -40 mg (FIORICET, ESGIC) -40 mg per tablet Take 1 tablet by mouth 2 (two) times daily as needed for Headaches. 30 tablet 1    multivitamin (THERAGRAN) per tablet Take by mouth.      pantoprazole (PROTONIX) 40 MG tablet Take 1 tablet (40 mg total) by mouth once daily. 90 tablet 1    tretinoin (ALTRALIN) 0.05 % gel Apply 1 application topically nightly.      vortioxetine (TRINTELLIX) 20 mg Tab Take 1 tablet (20 mg total) by mouth Daily. 90 tablet 1     No current facility-administered medications for this visit.      Review of patient's allergies indicates:   Allergen Reactions    Penicillins Hives      History reviewed. No pertinent past medical history.  Past Surgical History:   Procedure Laterality Date     SECTION      TUBAL LIGATION         Review of Systems   Constitutional: Positive for irritability. Negative for activity change, appetite change, chills, diaphoresis, fatigue, fever and unexpected weight change.   HENT: Negative for congestion, nosebleeds, rhinorrhea, sore throat and voice change.    Eyes: Negative for pain, discharge and visual disturbance.   Respiratory: Negative for apnea, cough, shortness of breath and wheezing.    Cardiovascular: Negative for chest pain, palpitations and leg swelling.   Gastrointestinal: Negative for abdominal pain, constipation, diarrhea, nausea and vomiting.   Endocrine: Negative for polydipsia, polyphagia and polyuria.        Hair loss   Genitourinary: Negative for difficulty urinating, dysuria, frequency, urgency  "and vaginal discharge.   Musculoskeletal: Negative for arthralgias, gait problem and myalgias.   Skin: Negative for color change, pallor and rash.   Allergic/Immunologic: Negative for immunocompromised state.   Neurological: Negative for dizziness, syncope, weakness, numbness and headaches.   Hematological: Negative for adenopathy. Does not bruise/bleed easily.   Psychiatric/Behavioral: Positive for decreased concentration, dysphoric mood and sleep disturbance. Negative for agitation, confusion, self-injury and suicidal ideas. The patient is nervous/anxious.       OBJECTIVE:      Vitals:    09/28/18 0901   BP: 118/70   Pulse: 68   Resp: 18   SpO2: 98%   Weight: 75.3 kg (166 lb 1.6 oz)   Height: 5' 4" (1.626 m)     Physical Exam   Constitutional: She is oriented to person, place, and time. She appears well-developed and well-nourished.   HENT:   Head: Normocephalic and atraumatic.   Right Ear: External ear normal.   Left Ear: External ear normal.   Nose: Nose normal.   Mouth/Throat: Oropharynx is clear and moist.   Eyes: Conjunctivae, EOM and lids are normal. Pupils are equal, round, and reactive to light. Right eye exhibits no discharge. Left eye exhibits no discharge. No scleral icterus.   Neck: Normal range of motion. Neck supple. Carotid bruit is not present. No thyromegaly present.   Cardiovascular: Normal rate, regular rhythm, normal heart sounds and intact distal pulses. Exam reveals no gallop and no friction rub.   No murmur heard.  Pulmonary/Chest: Effort normal and breath sounds normal. No stridor. No respiratory distress. She has no wheezes. She has no rales.   Abdominal: Soft. Bowel sounds are normal. She exhibits no distension and no mass. There is no hepatosplenomegaly. There is no tenderness. There is no rigidity, no rebound, no guarding and no CVA tenderness.   Musculoskeletal: Normal range of motion.   Lymphadenopathy:     She has no cervical adenopathy.   Neurological: She is alert and oriented to " person, place, and time.   Skin: Skin is warm, dry and intact. Capillary refill takes less than 2 seconds. No rash noted. She is not diaphoretic. No erythema.   Psychiatric: She has a normal mood and affect. Her behavior is normal. Judgment and thought content normal. She expresses no suicidal plans.      Assessment:       1. Anxiety    2. Gastric reflux    3. Dyslipidemia    4. Hair loss        Plan:       Anxiety   Increase trintellix to 20 mg daily  -     vortioxetine (TRINTELLIX) 20 mg Tab; Take 1 tablet (20 mg total) by mouth Daily.  Dispense: 90 tablet; Refill: 1  -     amitriptyline (ELAVIL) 10 MG tablet; Take 1 tablet (10 mg total) by mouth every evening.  Dispense: 90 tablet; Refill: 1    Gastric reflux   Stable; continue current medications. Complications of long term PPI use discussed with patient; she states that zantac did not work; she will schedule follow up with GI soon  -     pantoprazole (PROTONIX) 40 MG tablet; Take 1 tablet (40 mg total) by mouth once daily.  Dispense: 90 tablet; Refill: 1    Dyslipidemia   High fiber, low fat diet, daily aerobic exercise; fish oil 2/day and metamucil supplement daily    Hair loss   Hydrate well, vitamin supplement; will recheck TSH with next labs      Follow-up in about 6 months (around 3/28/2019) for med refill/anxiety.      9/28/2018 SAADIA Caldwell, WENDYP

## 2019-01-25 DIAGNOSIS — M79.671 RIGHT FOOT PAIN: Primary | ICD-10-CM

## 2019-01-28 ENCOUNTER — HOSPITAL ENCOUNTER (OUTPATIENT)
Dept: RADIOLOGY | Facility: HOSPITAL | Age: 44
Discharge: HOME | End: 2019-01-28
Attending: ORTHOPAEDIC SURGERY
Payer: COMMERCIAL

## 2019-01-28 ENCOUNTER — TELEPHONE (OUTPATIENT)
Dept: ORTHOPEDICS | Facility: CLINIC | Age: 44
End: 2019-01-28

## 2019-01-28 ENCOUNTER — OFFICE VISIT (OUTPATIENT)
Dept: ORTHOPEDICS | Facility: CLINIC | Age: 44
End: 2019-01-28
Payer: COMMERCIAL

## 2019-01-28 VITALS
SYSTOLIC BLOOD PRESSURE: 135 MMHG | HEART RATE: 72 BPM | WEIGHT: 166 LBS | HEIGHT: 64 IN | DIASTOLIC BLOOD PRESSURE: 83 MMHG | BODY MASS INDEX: 28.34 KG/M2

## 2019-01-28 DIAGNOSIS — M76.822 POSTERIOR TIBIAL TENDINITIS, LEFT: Primary | ICD-10-CM

## 2019-01-28 DIAGNOSIS — M25.571 RIGHT ANKLE PAIN, UNSPECIFIED CHRONICITY: Primary | ICD-10-CM

## 2019-01-28 DIAGNOSIS — M79.671 RIGHT FOOT PAIN: ICD-10-CM

## 2019-01-28 DIAGNOSIS — M79.672 LEFT FOOT PAIN: Primary | ICD-10-CM

## 2019-01-28 PROCEDURE — 99243 PR OFFICE CONSULTATION,LEVEL III: ICD-10-PCS | Mod: S$GLB,,, | Performed by: ORTHOPAEDIC SURGERY

## 2019-01-28 PROCEDURE — 99243 OFF/OP CNSLTJ NEW/EST LOW 30: CPT | Mod: S$GLB,,, | Performed by: ORTHOPAEDIC SURGERY

## 2019-01-28 PROCEDURE — 99999 PR PBB SHADOW E&M-EST. PATIENT-LVL III: ICD-10-PCS | Mod: PBBFAC,,, | Performed by: ORTHOPAEDIC SURGERY

## 2019-01-28 PROCEDURE — 99999 PR PBB SHADOW E&M-EST. PATIENT-LVL III: CPT | Mod: PBBFAC,,, | Performed by: ORTHOPAEDIC SURGERY

## 2019-01-28 NOTE — LETTER
January 28, 2019        Georgia Troncoso, FNP  [address]  [facility] [address]             [facility] [facility] [address]  [facility] [address]  Phone: [phone]   Patient: Genet Martinez   MR Number: [id number]   YOB: 1975   Date of Visit: 1/28/2019       Dear Dr. Troncoso:    Thank you for referring Genet Martinez to me for evaluation. Attached you will find relevant portions of my assessment and plan of care.    If you have questions, please do not hesitate to call me. I look forward to following Genet Martinez along with you.    Sincerely,      Stewart Duque MD            CC  No Recipients    Enclosure

## 2019-01-28 NOTE — PROGRESS NOTES
History reviewed. No pertinent past medical history.    Past Surgical History:   Procedure Laterality Date     SECTION      TUBAL LIGATION         Current Outpatient Medications   Medication Sig    amitriptyline (ELAVIL) 10 MG tablet Take 1 tablet (10 mg total) by mouth every evening.    butalbital-acetaminophen-caffeine -40 mg (FIORICET, ESGIC) -40 mg per tablet Take 1 tablet by mouth 2 (two) times daily as needed for Headaches.    multivitamin (THERAGRAN) per tablet Take by mouth.    pantoprazole (PROTONIX) 40 MG tablet Take 1 tablet (40 mg total) by mouth once daily.    tretinoin (ALTRALIN) 0.05 % gel Apply 1 application topically nightly.    vortioxetine (TRINTELLIX) 20 mg Tab Take 1 tablet (20 mg total) by mouth Daily.    ALPRAZolam (XANAX) 0.25 MG tablet Take 1 tablet (0.25 mg total) by mouth daily as needed for Anxiety.     No current facility-administered medications for this visit.        Review of patient's allergies indicates:   Allergen Reactions    Penicillins Hives       Family History   Problem Relation Age of Onset    Hypertension Father     Hyperlipidemia Father        Social History     Socioeconomic History    Marital status:      Spouse name: Not on file    Number of children: Not on file    Years of education: Not on file    Highest education level: Not on file   Social Needs    Financial resource strain: Not on file    Food insecurity - worry: Not on file    Food insecurity - inability: Not on file    Transportation needs - medical: Not on file    Transportation needs - non-medical: Not on file   Occupational History    Not on file   Tobacco Use    Smoking status: Never Smoker    Smokeless tobacco: Never Used   Substance and Sexual Activity    Alcohol use: No    Drug use: No    Sexual activity: Not on file   Other Topics Concern    Not on file   Social History Narrative    Not on file       Chief Complaint:   Chief Complaint   Patient  presents with    Foot Pain       History of present illness:  This is a 43-year-old female seen in consultation for Dr. Troncoso.  The patient has had left foot pain since March of 2018.  Denies an injury or trauma.  Pain along the medial ankle and into the medial arch.  Has tried some arch supports without success.  Symptoms are stable in moderate to severe.  Pain with walking or standing for prolonged periods of time.  Pain is an 8/10.  Denies swelling.      Review of Systems:    Constitution: Negative for chills, fever, and sweats.  Negative for unexplained weight loss.    HENT:  Positive for headaches and blurry vision.    Cardiovascular:Negative for chest pain or irregular heart beat. Negative for hypertension.    Respiratory:  Negative for cough and shortness of breath.    Gastrointestinal: Negative for abdominal pain, heartburn, melena, nausea, and vomitting.    Genitourinary:  Negative bladder incontinence and dysuria.    Musculoskeletal:  See HPI    Neurological: Negative for numbness.    Psychiatric/Behavioral: Negative for depression.  The patient is nervous/anxious.      Endocrine: Negative for polyuria    Hematologic/Lymphatic: Negative for bleeding problem.  Does not bruise/bleed easily.    Skin: Negative for poor would healing and rash      Physical Examination:    Vital Signs:    Vitals:    01/28/19 0922   BP: 135/83   Pulse: 72       Body mass index is 28.49 kg/m².    This a well-developed, well nourished patient in no acute distress.  They are alert and oriented and cooperative to examination.  Pt. walks without an antalgic gait.      Examination of the patient's left foot and ankle shows no signs of rashes or erythema. The patient has no ecchymosis or effusion or masses. The patient has a negative anterior drawer and talar tilting exam. The patient has full range of motion of ankle dorsiflexion, plantarflexion, inversion, and eversion. Patient has 5 out of 5 motor strength in all muscle groups.  Patient has 2+ dorsalis pedis pulses and intact light touch sensation. The patient is nontender over both medial ankle ligaments and medial malleolus as well as lateral ankle ligaments and the lateral malleolus. Negative squeeze test.  Tender over the course of the posterior tibial tendon and along the medial navicular    Examination of the patient's right foot and ankle shows no signs of rashes or erythema. The patient has no ecchymosis or effusion or masses. The patient has a negative anterior drawer and talar tilting exam. The patient has full range of motion of ankle dorsiflexion, plantarflexion, inversion, and eversion. Patient has 5 out of 5 motor strength in all muscle groups. Patient has 2+ dorsalis pedis pulses and intact light touch sensation. The patient is nontender over both medial ankle ligaments and medial malleolus as well as lateral ankle ligaments and the lateral malleolus. Negative squeeze test.        X-rays:  X-rays of the left foot are available from outside facility which show no acute fractures or significant arthritis     Assessment::  Left posterior tibial tendinitis    Plan:  Reviewed the finding with her today. I recommended physical therapy for the posterior tibial tendinitis.  We talked about possibly putting her boot her cast.  Might need an MRI in the future if not improving as well.    This note was created using [employer] voice recognition software that occasionally misinterpreted phrases or words.    Consult note is delivered via Epic messaging service.

## 2019-02-06 ENCOUNTER — CLINICAL SUPPORT (OUTPATIENT)
Dept: REHABILITATION | Facility: HOSPITAL | Age: 44
End: 2019-02-06
Attending: ORTHOPAEDIC SURGERY
Payer: COMMERCIAL

## 2019-02-06 DIAGNOSIS — M79.672 FOOT PAIN, LEFT: Primary | ICD-10-CM

## 2019-02-06 PROCEDURE — 97035 APP MDLTY 1+ULTRASOUND EA 15: CPT | Mod: PN

## 2019-02-06 PROCEDURE — 97161 PT EVAL LOW COMPLEX 20 MIN: CPT | Mod: PN

## 2019-02-06 PROCEDURE — 97110 THERAPEUTIC EXERCISES: CPT | Mod: PN

## 2019-02-06 NOTE — PLAN OF CARE
"  TIME RECORD    Date: 02/06/2019    Start Time:  0902  Stop Time:  0958    PROCEDURES:    TIMED  Procedure Time Min.     Ultrasound Start:  0948  Stop:  0958   10    Start:  Stop:     Start:  Stop:     Start:  Stop:          UNTIMED  Procedure Time Min.     Initial evaluation Start:  0902  Stop:  0945   40    Start:  Stop:      Total Timed Minutes:  10  Total Timed Units:  1  Total Untimed Units:  1  Charges Billed/# of units:  2    OUTPATIENT PHYSICAL THERAPY   PATIENT EVALUATION    Onset Date: about a year ago  Primary Diagnosis:   1. Foot pain, left       Treatment Diagnosis: L foot pain,   No past medical history on file.  Precautions: Standard  Prior Therapy: No  Medications: Genet Martinez has a current medication list which includes the following prescription(s): alprazolam, amitriptyline, butalbital-acetaminophen-caffeine -40 mg, multivitamin, pantoprazole, tretinoin, and vortioxetine.  Nutrition:  Overweight  History of Present Illness: Pt presents to PT with history of insidious onset L foot pain of about a year duration.  She reports that over the past year the symptoms have worsened somewhat.  She states that she really hasn't done much to treat the symptoms other than changing shoes and adding arch support.  Did not seek treatment until recently when she "got tired of the pain"  Saw ortho recently.  X-rays were negative.  Referred to PT.    Prior Level of Function: Independent  Social History: Lives with family.  Primarily responsible for household chores and cooking.  Works full time as pharmacy tech (requires frequent static standing).    Place of Residence (Steps/Adaptations): 1 [place] home with no steps to enter.    Functional Deficits Leading to Referral/Nature of Injury: Limited from participating in aerobic type exercise.  Difficulty with work related activities due to increased pain with prolonged standing.  Sleep is undisturbed at this time.  Minimal increase in pain with initiating " "weight bearing.  Irritated by foot gear.   Patient Therapy Goals: To not have this pain anymore.      Subjective     Genet Martinez states "I kind of wish he would've just scheduled the MRI cause X-rays don't always show everything:.    Pain:  Location: Medial border of L foot extending up along medial malleolus to distal 1/4 of tibia.    Description: Constant aching, with intermittent sharp, shooting pain  Activities Which Increase Pain: Prolonged standing, walking, step negotiation  Activities Which Decrease Pain: Rest  Pain Scale: 5/10 at best 7/10 now  8/10 at worst    Objective     Posture: Standin  g: unremarkable, sitting: unremarkable.    Palpation: Tenderness present distal L medial malleolus extending proximally to distal 1/4 of tibia.    Sensation: Reports intermittent tingling in affected area.    DTRs:  N/A  Range of Motion/Strength:   Ankle  Right   Left  Pain/Dysfunction with Movement    AROM PROM MMT AROM PROM MMT    Plantarflexion  45*  70*  4+/5  50*  65*  4+/5    Dorsiflexion  8*  18*  4+/5  5*  12*  4/5    Inversion  30*  40*  4+/5  30*  40*  4/5  L discomfort with passive inversion   Eversion  30*  35*  4+/5  15*  25*  4+/5    B Knees grossly WNL and  4+/5    Flexibility: Hamstrings and gastrocs minimal tightness present.    Gait: Without AD  Analysis: No deficits noted at walking rowdy.    Bed Mobility:Independent  Transfers: Independent  Special Tests: Girth: @ malleoli R 24.6 cm/L 25.0 cm; @ mid tarsal joint R 23.5 cm/L 23.5 cm; Figure 8 R 50.8 cm/L 50.3 cm  Other: FOTO completed.    Treatment: Initial evaluation completed.  Educated pt in role of PT and proposed POC.  Verbalized understanding and agreement.  Initiated US @ 1.2 w/cm2 x 8 min.  50% duty cycle, 3 MHz.      Assessment       Initial Assessment (Pertinent finding, problem list and factors affecting outcome): Pt presents to PT with history of chronic L foot/ankle pain.  Problems include intermittent medial foot/ankle pain, " tenderness in involved area, minimally impaired ROM, decreased LE flexibility.  These problems contribute to limitations in participation in her more vigorous activities including fitness activities, prolonged standing (work related activities).  She should benefit from skilled PT services to address these issues.    Rehab Potiential: fair    Short Term Goals (2 Weeks): 1) Decrease max pain to < 6/10, 2) Improve standing tolerance to >  40 min to allow for improved tolerance for meal prep, 3) Facilitate improved walking tolerance to allow pt to completer grocery shopping without increased pain.    Long Term Goals (4 Weeks): 1) Pt will demonstrate improvement in standing tolerance so that patient can resume regular household activities, 2) Facilitate improved walking tolerance so that patient can participate in work activities with minimal disruption of work activities, 3) Pt will be independent in HEP.      Plan     Certification Period: 02/06/2019 to 03/06/2019  Recommended Treatment Plan: 2 times per week for 4 weeks: Fluidotherapy, Gait Training, Manual Therapy, Moist Heat/ Ice, Orthotic Management and Training, Patient Education, Self Care, Therapeutic Exercise and Ultrasound  Other Recommendations: Pt may benefit from dry needling PRN to assist with symptom managemdn      Therapist: Alma Song, PT    I CERTIFY THE NEED FOR THESE SERVICES FURNISHED UNDER THIS PLAN OF TREATMENT AND WHILE UNDER MY CARE    Physician's comments: ________________________________________________________________________________________________________________________________________________      Physician's Name: ___________________________________

## 2019-02-12 ENCOUNTER — CLINICAL SUPPORT (OUTPATIENT)
Dept: REHABILITATION | Facility: HOSPITAL | Age: 44
End: 2019-02-12
Attending: ORTHOPAEDIC SURGERY
Payer: COMMERCIAL

## 2019-02-12 DIAGNOSIS — M79.672 FOOT PAIN, LEFT: ICD-10-CM

## 2019-02-12 PROCEDURE — 97110 THERAPEUTIC EXERCISES: CPT | Mod: PN

## 2019-02-12 PROCEDURE — 97035 APP MDLTY 1+ULTRASOUND EA 15: CPT | Mod: PN

## 2019-02-12 NOTE — PROGRESS NOTES
Name: Genet Martinez  Date:   02/12/2019  Primary Diagnosis: .  Problem List Items Addressed This Visit     Foot pain, left          Time in: 1028  Time Out: 1045  Total Treatment Time: 17  Group Time: 0      Subjective:    Patient reports improvement of symptoms following US.  Patient reports their pain to be 6/10 on a 0-10 scale with 0 being no pain and 10 being the worst pain imaginable.    Objective    Patient received individual therapy to address strength, endurance, ROM and flexibility with 0 other patients with activities as follows:     Patient received therapeutic exercises to develop strength, endurance, ROM and flexibility for 9 minutes including:     Seated gastroc stretch 3/30 sec c/ towel  Seated soleus stretch 3/30 sec c/ towel  IV/EV x 20    US x 8 min at 1.2 w/cm2, 50% duty cycle, 3 MHz    Assessment:     Pain with IV. Decreased pain following US.    Pt will continue to benefit from skilled PT intervention. Medical Necessity is demonstrated by:  Pain limits function of effected part for some activities.    Patient is making good progress towards established goals.    Plan:  Continue with established Plan of Care towards PT goals.

## 2019-02-20 ENCOUNTER — CLINICAL SUPPORT (OUTPATIENT)
Dept: REHABILITATION | Facility: HOSPITAL | Age: 44
End: 2019-02-20
Attending: ORTHOPAEDIC SURGERY
Payer: COMMERCIAL

## 2019-02-20 DIAGNOSIS — M79.672 LEFT FOOT PAIN: Primary | ICD-10-CM

## 2019-02-20 PROCEDURE — 97140 MANUAL THERAPY 1/> REGIONS: CPT | Mod: PN

## 2019-02-20 PROCEDURE — 97110 THERAPEUTIC EXERCISES: CPT | Mod: PN

## 2019-02-20 NOTE — PROGRESS NOTES
TIME RECORD    Date:  02/20/2019    Start Time:  1000  Stop Time:  1050    PROCEDURES:    TIMED  Procedure Time Min.   TE Start:1000  Stop:1015 15   MTT/DN Start:1020  Stop:1045 25    Start:  Stop:     Start:  Stop:          UNTIMED  Procedure Time Min.    Start:  Stop:     Start:  Stop:      Total Timed Minutes:  40  Total Timed Units:  3  Total Untimed Units:    Charges Billed/# of units:  3      Progress/Current Status    Subjective:     Patient ID: Genet Martinez is a 43 y.o. female.  Diagnosis:   1. Left foot pain       Pain: 7 /10  Pt wants to try dry needling.    Objective:     Nustep x 15' f/b dry needling to lt tib ant medial aspect of lt foot with IMS x 12', HAs 4,5,6,24.    Assessment:     Tolerated DN very well.    Patient Education/Response:     Expect to be sore.    Plans and Goals:     Cont per POC.

## 2019-02-21 ENCOUNTER — CLINICAL SUPPORT (OUTPATIENT)
Dept: REHABILITATION | Facility: HOSPITAL | Age: 44
End: 2019-02-21
Attending: ORTHOPAEDIC SURGERY
Payer: COMMERCIAL

## 2019-02-21 DIAGNOSIS — M79.672 FOOT PAIN, LEFT: ICD-10-CM

## 2019-02-21 PROCEDURE — 97035 APP MDLTY 1+ULTRASOUND EA 15: CPT | Mod: PN

## 2019-02-21 PROCEDURE — 97110 THERAPEUTIC EXERCISES: CPT | Mod: PN

## 2019-02-21 NOTE — PROGRESS NOTES
"Name: Genet Martinez  [facility] Number: [id number]  Date of Treatment: 02/21/2019   Diagnosis:   Encounter Diagnosis   Name Primary?    Foot pain, left        Time in: 1600  Time Out: 1647  Total Treatment Time: 47  Group Time: 0      Subjective:    Genet reports improvement of symptoms since dry needling.  Patient reports their pain to be 4/10 on a 0-10 scale with 0 being no pain and 10 being the worst pain imaginable.    Objective    Genet received therapeutic exercises to develop strength, endurance and flexibility for 39 minutes including:     NuStep Lv4 10'  Gastroc stretch 3x30" 1/2 roll B  Soleus stretcfh 3x30" 1/2 roll B  Inversion/eversion x20 over 1/2 rolls B  HR/TR x20 Airex  Mini squats x20 Airex  Ankle 4-way x30 Green Tband L    The patient received the following direct contact modalities after being cleared for contraindications: Ultrasound:  Genet received ultrasound to manage pain and inflammation at 100 % duty cycle applied to the L medial ankle at an intensity of 1.5 W/cm2  for a duration of 8 minutes. Patient tolerated treatment well without adverse effects. Therapist was in attendance throughout intervention.    Pt demo good understanding of the education provided. Genet demonstrated good return demonstration of activities.     Assessment:     Pt will continue to benefit from skilled PT intervention. Medical Necessity is demonstrated by:  Pain limits function of effected part for some activities, Unable to participate fully in daily activities, Requires skilled supervision to complete and progress HEP and Weakness.    Plan:  Continue with established Plan of Care towards PT goals.   "

## 2019-02-25 ENCOUNTER — CLINICAL SUPPORT (OUTPATIENT)
Dept: REHABILITATION | Facility: HOSPITAL | Age: 44
End: 2019-02-25
Attending: ORTHOPAEDIC SURGERY
Payer: COMMERCIAL

## 2019-02-25 DIAGNOSIS — M79.672 LEFT FOOT PAIN: Primary | ICD-10-CM

## 2019-02-25 PROCEDURE — 97022 WHIRLPOOL THERAPY: CPT | Mod: PN

## 2019-02-25 PROCEDURE — 97110 THERAPEUTIC EXERCISES: CPT | Mod: PN

## 2019-02-25 NOTE — PROGRESS NOTES
02/25/19 1600   Ankle Exercises   Band Plantarflexion Reps/Sets/Resistance gtb 30   Band Dorsiflexion Reps/Sets/Resistance gtb 30   Knee Exercises   Tg/Shuttle/Reformer Squats Reps/Sets/Weight/Level 50# 6'.   Additional Exercises   Additional Exercise nustep 12' l4,   Additional Exercise AROM

## 2019-02-25 NOTE — PROGRESS NOTES
TIME RECORD    Date:  02/25/2019    Start Time:  1610  Stop Time:  1652    PROCEDURES:    TIMED   Procedure Time Min.   TE Start:1610  Stop:1638 28    Start:  Stop:     Start:  Stop:     Start:  Stop:          UNTIMED  Procedure Time Min.   FLUIDO  Start:1640  Stop:1652 12    Start:  Stop:      Total Timed Minutes:  28  Total Timed Units:  2  Total Untimed Units:  1  Charges Billed/# of units:  3      Progress/Current Status    Subjective:     Patient ID: Genet Martinez is a 43 y.o. female.  Diagnosis:   1. Left foot pain       Pain: 2 /10  Better.    Objective:     TE for ROM,strength,f/b fluido x 12'.    Assessment:     Improving.    Patient Education/Response:     Gait pattern ed.    Plans and Goals:     Cont per POC.

## 2019-03-01 ENCOUNTER — CLINICAL SUPPORT (OUTPATIENT)
Dept: REHABILITATION | Facility: HOSPITAL | Age: 44
End: 2019-03-01
Attending: ORTHOPAEDIC SURGERY
Payer: COMMERCIAL

## 2019-03-01 DIAGNOSIS — M79.672 FOOT PAIN, LEFT: ICD-10-CM

## 2019-03-01 PROCEDURE — 97140 MANUAL THERAPY 1/> REGIONS: CPT | Mod: PN

## 2019-03-01 PROCEDURE — 97110 THERAPEUTIC EXERCISES: CPT | Mod: PN

## 2019-03-01 PROCEDURE — 97022 WHIRLPOOL THERAPY: CPT | Mod: PN

## 2019-03-04 ENCOUNTER — CLINICAL SUPPORT (OUTPATIENT)
Dept: REHABILITATION | Facility: HOSPITAL | Age: 44
End: 2019-03-04
Attending: ORTHOPAEDIC SURGERY
Payer: COMMERCIAL

## 2019-03-04 DIAGNOSIS — M79.672 LEFT FOOT PAIN: Primary | ICD-10-CM

## 2019-03-04 PROCEDURE — 97022 WHIRLPOOL THERAPY: CPT | Mod: PN

## 2019-03-04 PROCEDURE — 97150 GROUP THERAPEUTIC PROCEDURES: CPT | Mod: PN

## 2019-03-04 NOTE — PROGRESS NOTES
03/04/19 1400   Ankle Exercises   Band Plantarflexion Reps/Sets/Resistance GTB 30   Band Dorsiflexion Reps/Sets/Resistance GTB 3050# 6'   Double Leg Calf Raises Reps/Sets/Weight 30   Standing Dorsiflexion Stretch(Gastroc) Reps/Sets/Hold Time 3X30   Knee Exercises   Tg/Shuttle/Reformer Squats Reps/Sets/Weight/Level 50# 6'   Additional Exercises   Additional Exercise NUSTEP 12' L4   Additional Exercise AROM

## 2019-03-04 NOTE — PROGRESS NOTES
CHIEF COMPLAINT:  Chief Complaint   Patient presents with   •  Symptoms       HISTORY OF PRESENT ILLNESS:  The patient is a 50 year old female who comes in today with complaint of urinary frequency and urgency since yesterday. She denies fever, dysuria, abdominal pain, and back pain.       HISTORY/MEDICATIONS/ALLERGIES:  Reviewed in Epic.    REVIEW OF SYSTEMS:  As per history of present illness. The rest of the cardiovascular, respiratory, gastrointestinal, neurologic, urinary and musculoskeletal and all other systems are reviewed and are negative.     PHYSICAL EXAM:  VITALS:    Visit Vitals   • /74 (BP Location: [facility], Patient Position: Sitting, Cuff Size: Regular)   • Pulse 83   • Temp 97.9 °F (36.6 °C) (Oral)   • Resp 18   • Wt 78 kg   • SpO2 97%   • BMI 28.62 kg/m2     GENERAL:  Well developed, alert and oriented to person place and time, in no acute distress.  BACK:  No costovertebral angle tenderness.    Urinalysis:  Small amount of leukocyte esterase, positive nitrite, trace of blood and protein, large amount of bacteria, and elevated WBC. Culture pending.      ASSESSMENT:  1. Urine frequency    2. Acute UTI        PLAN:  Will treat with Macrobid as per orders. Medication risk and benefits, proper use, and potential side effects discussed. She is advised to push fluids. She will follow-up if her symptoms do not fully resolve.  Orders Placed This Encounter   • Urinalysis & Reflex Micro with Culture if Indicated   • Ascorbic Acid (VITAMIN C ADULT GUMMIES PO)   • Cyanocobalamin (VITAMELTS ENERGY VITAMIN B-12 PO)   • nitrofurantoin, macrocrystal-monohydrate, (MACROBID) 100 MG capsule      Follow up as needed. Return to clinic if symptoms persist, worsen, or new symptoms develop.    Instructions provided as documented in the after visit summary.        TIME RECORD    Date:  03/04/2019    Start Time:  0705  Stop Time:  0756    PROCEDURES:    TIMED  Procedure Time Min.    Start:  Stop:     Start:  Stop:     Start:  Stop:     Start:  Stop:          UNTIMED  Procedure Time Min.   GRP TE Start:0705  Stop:0745 0740   FLUIDO Start:0746  Stop:0756 10     Total Timed Minutes:    Total Timed Units:    Total Untimed Units:  2  Charges Billed/# of units:  2      Progress/Current Status    Subjective:     Patient ID: Genet Martinez is a 43 y.o. female.  Diagnosis:   1. Left foot pain     4  Pain: 4 /10      Objective:     TE for ROM,strength in group setting due to overlapping 35' with another pt f/b fluido x 10'.    Assessment:     Tolerated Rx well.Gait pattern ed.    Patient Education/Response:       Plans and Goals:     Cont per POC.

## 2019-03-06 ENCOUNTER — CLINICAL SUPPORT (OUTPATIENT)
Dept: REHABILITATION | Facility: HOSPITAL | Age: 44
End: 2019-03-06
Attending: ORTHOPAEDIC SURGERY
Payer: COMMERCIAL

## 2019-03-06 DIAGNOSIS — M79.672 FOOT PAIN, LEFT: ICD-10-CM

## 2019-03-06 PROCEDURE — 97110 THERAPEUTIC EXERCISES: CPT | Mod: PN

## 2019-03-06 PROCEDURE — 97140 MANUAL THERAPY 1/> REGIONS: CPT | Mod: PN

## 2019-03-06 NOTE — PROGRESS NOTES
Name: Genet Martinez  [facility] Number: [id number]  Date of Treatment: 03/01/2019   Diagnosis:   Encounter Diagnosis   Name Primary?    Foot pain, left        Time in: 1503  Time Out: 1600  Total Treatment Time: 53  Group Time: 0      Subjective:    Genet reports improvement of symptoms.  Patient reports their pain to be 4/10 on a 0-10 scale with 0 being no pain and 10 being the worst pain imaginable.    Objective    Patient received individual therapy to increase strength and flexibility with 0 patients with activities as follows:     Genet received therapeutic exercises to develop strength and flexibility for 30 minutes including:    Nu-step L4 x 10'   Standing gastroc stretch 3x30s   Standing soleus stretch 3x30s    Heel raise/toe raise on airex x 30   Minisquat on airex x 30   Standing resisted inversion while standing on 1/2 foam roll and using green band x 30   Resisted ankle 4-way ex using green tband x 30    The patient received the following supervised modalities after being cleared for contradictions: Fluidotherapy @ 110* x 14'    Completed treatment with KT taping to L ankle utilizing mechanical correction and posterior tibialis facilitation.  Instructed pt in care and removal of tape.      Written Home Exercises Provided: Reviewed.    Pt demo good understanding of the education provided. Genet demonstrated good return demonstration of activities.     Assessment:   Symptoms improving.  Some difficulty with standing inversion exercises with increased fatigue reported.      Pt will continue to benefit from skilled PT intervention. Medical Necessity is demonstrated by:  Unable to participate fully in daily activities, Requires skilled supervision to complete and progress HEP and Weakness.    Patient is making fair progress towards established goals.    New/Revised goals: N/A      Plan:  Continue with established Plan of Care towards PT goals. Progress standing ankle stabilization activities as  patient tolerates including Bosu activities, Continue KT taping.

## 2019-03-07 NOTE — PROGRESS NOTES
Name: Genet Martinez  [facility] Number: [id number]  Date of Treatment: 03/06/2019  Diagnosis:   Encounter Diagnosis   Name Primary?    Foot pain, left        Time in: 0900  Time Out: 0955  Total Treatment Time: 50  Group Time: 0      Subjective:    Genet reports improvement of symptoms.  Patient reports their pain to be 4/10 on a 0-10 scale with 0 being no pain and 10 being the worst pain imaginable.  Pt reports that she walked the parade route with her daughter Friday night and experienced a significant increase in pain during and following the activity.  However, she states that overall her pain is better.     Objective    Patient received individual therapy to increase strength, flexibility and ankle stabilization with 0 patients with activities as follows:     Genet received therapeutic exercises to develop strength, flexibility and ankle stabilization for 35 minutes including:    Nu-step L5 x 10 min   Standing gastroc stretch with 1/2 foam roll 3x30s   Standing soleus stretch with 1/2 foam roll 3x30s   Resisted inversion using gtb while standing on 2 - 1/2 foam rolls x 30   Heel raise/toe raise on airex x 30   Mini squat on airex x 30   SLS L on airex 3x30s   Resisted ankle 4 way using gtb x 30 ea   Standing ankle stabilization: R hip resisted 4 way while standing in SLS on L x 30 ea    Genet received the following manual therapy techniques: KT taping were applied to the: L ankle for 10 minutes.     Recheck for POC update.  See report  Written Home Exercises Provided: Reviewed.    Pt demo good understanding of the education provided. Genet demonstrated good return demonstration of activities.     Assessment:   Symptoms improving with decreased frequency of symptoms.      Pt will continue to benefit from skilled PT intervention. Medical Necessity is demonstrated by:  Unable to participate fully in daily activities, Requires skilled supervision to complete and progress HEP and Weakness.    Patient is  making good progress towards established goals.    New/Revised goals: See updated POC      Plan:  Continue with established Plan of Care towards PT goals. Focus on standing ankle stabilization activities.

## 2019-03-08 NOTE — PLAN OF CARE
Date: 03/06/2019      PHYSICAL THERAPY UPDATED PLAN OF TREATMENT    Patient name: Genet Martinez  Onset Date:  About a year ago  SOC Date:  02/06/2019  Primary Diagnosis:    1. Foot pain, left       Treatment Diagnosis:  L foot pain  Certification Period:  02/06/2019 to 03/06/2019  Precautions:  Standard  Visits from SOC:  8  Functional Level Prior to SOC:  Limited from participating in aerobic type exercise.  Difficulty with work related activities due to increased pain with prolonged standing.  Sleep is undisturbed at this time.  Minimal increase in pain with initiating weight bearing.  Irritated by foot gear.     Updated Assessment:  Pain current 3/10, best 2/10, worst 8/10 (after walking parade route Friday evening).    L ankle ROM WNL  Strength L ankle  DF 4/5, PF 4+/5, inversion 4+/5, eversion 4+/5  Toe flex 4/5=4+/5, ext 4+/5  Palpation:  Tenderness persists at distal portion of L medial malleolus.  Intensity of tenderness decreased    Gait:  Independent without devices at waking rowdy.  No significant deviations noted.  Pt continues to report increased pain with sustained walking activities.    Pt reports overall improvement in symptoms.  She is able to initiate gait, stand for longer periods, and walk for longer periods before symptoms increase.  Overall symptom intensity has decreased with her regular activities.      Previous Short Term Goals Status:   1) Decrease max pain to < 6/10, Progressing  2) Improve standing tolerance to >  40 min to allow for improved tolerance for meal prep, MET  3) Facilitate improved walking tolerance to allow pt to completer grocery shopping without increased pain.  MET     New Short Term Goals Status:   1) Decrease max pain to < 5/10, 2) Resolve tenderness, 3) Improve standing tolerance to > 1 hour without increased pain.      Long Term Goal Status:   continue per initial plan of care.1) Pt will demonstrate improvement in standing tolerance so that patient can  resume regular household activities, 2) Facilitate improved walking tolerance so that patient can participate in work activities with minimal disruption of work activities, 3) Pt will be independent in HEP.    Reasons for Recertification of Therapy:   Pt is making steady progress in PT with improved ROM, improved strength, decreased tenderness, and decreased pain.  She continues to have intermittent symptoms, impaired ankle stability.  She should continue to benefit from skilled PT services to address remaining deficits.      Certification Period: 03/07/2019 to 04/06/2019  Recommended Treatment Plan: 2 times per week for 4 weeks: Fluidotherapy, Gait Training, Manual Therapy, Moist Heat/ Ice, Patient Education, Therapeutic Activites, Therapeutic Exercise and therapeutic taping as needed.    Other Recommendations: Pt may benefit from dry needling PRN.          Therapist's Name: Alma Song, PT   Date: 03/06/2019  I CERTIFY THE NEED FOR THESE SERVICES FURNISHED UNDER THIS PLAN OF TREATMENT AND WHILE UNDER MY CARE    Physician's comments: ________________________________________________________________________________________________________________________________________________      Physician's Name: ___________________________________

## 2020-09-14 ENCOUNTER — TELEPHONE (OUTPATIENT)
Dept: FAMILY MEDICINE | Facility: CLINIC | Age: 45
End: 2020-09-14

## 2020-10-22 DIAGNOSIS — Z12.31 OTHER SCREENING MAMMOGRAM: ICD-10-CM

## 2020-12-11 ENCOUNTER — TELEPHONE (OUTPATIENT)
Dept: FAMILY MEDICINE | Facility: CLINIC | Age: 45
End: 2020-12-11

## 2020-12-11 NOTE — TELEPHONE ENCOUNTER
----- Message from Ketan Bullock sent at 12/11/2020  8:45 AM CST -----  Contact: pt at [phone]  Type: Needs Medical Advice  Who Called:  pt  Best Call Back Number: [phone]  Additional Information: pt is calling about a bill she received. Please call back and advise    MESSAGE GIVEN TO RIKKI

## 2021-01-04 ENCOUNTER — PATIENT MESSAGE (OUTPATIENT)
Dept: ADMINISTRATIVE | Facility: HOSPITAL | Age: 46
End: 2021-01-04

## 2021-03-02 ENCOUNTER — TELEPHONE (OUTPATIENT)
Dept: FAMILY MEDICINE | Facility: CLINIC | Age: 46
End: 2021-03-02

## 2021-03-18 ENCOUNTER — TELEPHONE (OUTPATIENT)
Dept: FAMILY MEDICINE | Facility: CLINIC | Age: 46
End: 2021-03-18

## 2021-03-19 ENCOUNTER — TELEPHONE (OUTPATIENT)
Dept: FAMILY MEDICINE | Facility: CLINIC | Age: 46
End: 2021-03-19

## 2021-04-05 ENCOUNTER — PATIENT MESSAGE (OUTPATIENT)
Dept: ADMINISTRATIVE | Facility: HOSPITAL | Age: 46
End: 2021-04-05

## 2021-04-07 ENCOUNTER — TELEPHONE (OUTPATIENT)
Dept: FAMILY MEDICINE | Facility: CLINIC | Age: 46
End: 2021-04-07

## 2021-04-07 DIAGNOSIS — F32.A DEPRESSION, UNSPECIFIED DEPRESSION TYPE: ICD-10-CM

## 2021-04-07 DIAGNOSIS — K21.9 GASTRIC REFLUX: Primary | ICD-10-CM

## 2021-04-13 LAB — PAP SMEAR: NORMAL

## 2021-04-14 LAB
ALBUMIN SERPL-MCNC: 4.1 G/DL (ref 3.6–5.1)
ALBUMIN/GLOB SERPL: 1.5 (CALC) (ref 1–2.5)
ALP SERPL-CCNC: 74 U/L (ref 31–125)
ALT SERPL-CCNC: 9 U/L (ref 6–29)
AST SERPL-CCNC: 12 U/L (ref 10–35)
BASOPHILS # BLD AUTO: 38 CELLS/UL (ref 0–200)
BASOPHILS NFR BLD AUTO: 0.6 %
BILIRUB SERPL-MCNC: 0.4 MG/DL (ref 0.2–1.2)
BUN SERPL-MCNC: 18 MG/DL (ref 7–25)
BUN/CREAT SERPL: ABNORMAL (CALC) (ref 6–22)
CALCIUM SERPL-MCNC: 9 MG/DL (ref 8.6–10.2)
CHLORIDE SERPL-SCNC: 105 MMOL/L (ref 98–110)
CHOLEST SERPL-MCNC: 196 MG/DL
CHOLEST/HDLC SERPL: 4.2 (CALC)
CO2 SERPL-SCNC: 26 MMOL/L (ref 20–32)
CREAT SERPL-MCNC: 0.97 MG/DL (ref 0.5–1.1)
EOSINOPHIL # BLD AUTO: 132 CELLS/UL (ref 15–500)
EOSINOPHIL NFR BLD AUTO: 2.1 %
ERYTHROCYTE [DISTWIDTH] IN BLOOD BY AUTOMATED COUNT: 13.3 % (ref 11–15)
GLOBULIN SER CALC-MCNC: 2.8 G/DL (CALC) (ref 1.9–3.7)
GLUCOSE SERPL-MCNC: 102 MG/DL (ref 65–99)
HCT VFR BLD AUTO: 38.3 % (ref 35–45)
HDLC SERPL-MCNC: 47 MG/DL
HGB BLD-MCNC: 12.5 G/DL (ref 11.7–15.5)
LDLC SERPL CALC-MCNC: 133 MG/DL (CALC)
LYMPHOCYTES # BLD AUTO: 1846 CELLS/UL (ref 850–3900)
LYMPHOCYTES NFR BLD AUTO: 29.3 %
MCH RBC QN AUTO: 26.7 PG (ref 27–33)
MCHC RBC AUTO-ENTMCNC: 32.6 G/DL (ref 32–36)
MCV RBC AUTO: 81.8 FL (ref 80–100)
MONOCYTES # BLD AUTO: 517 CELLS/UL (ref 200–950)
MONOCYTES NFR BLD AUTO: 8.2 %
NEUTROPHILS # BLD AUTO: 3767 CELLS/UL (ref 1500–7800)
NEUTROPHILS NFR BLD AUTO: 59.8 %
NONHDLC SERPL-MCNC: 149 MG/DL (CALC)
PLATELET # BLD AUTO: 316 THOUSAND/UL (ref 140–400)
PMV BLD REES-ECKER: 10.9 FL (ref 7.5–12.5)
POTASSIUM SERPL-SCNC: 4.2 MMOL/L (ref 3.5–5.3)
PROT SERPL-MCNC: 6.9 G/DL (ref 6.1–8.1)
RBC # BLD AUTO: 4.68 MILLION/UL (ref 3.8–5.1)
SODIUM SERPL-SCNC: 139 MMOL/L (ref 135–146)
TRIGL SERPL-MCNC: 65 MG/DL
WBC # BLD AUTO: 6.3 THOUSAND/UL (ref 3.8–10.8)

## 2021-04-16 ENCOUNTER — OFFICE VISIT (OUTPATIENT)
Dept: FAMILY MEDICINE | Facility: CLINIC | Age: 46
End: 2021-04-16
Payer: COMMERCIAL

## 2021-04-16 VITALS
HEART RATE: 86 BPM | SYSTOLIC BLOOD PRESSURE: 136 MMHG | BODY MASS INDEX: 31.18 KG/M2 | TEMPERATURE: 98 F | HEIGHT: 63 IN | DIASTOLIC BLOOD PRESSURE: 84 MMHG | WEIGHT: 176 LBS | OXYGEN SATURATION: 100 %

## 2021-04-16 DIAGNOSIS — G43.109 MIGRAINE WITH AURA AND WITHOUT STATUS MIGRAINOSUS, NOT INTRACTABLE: ICD-10-CM

## 2021-04-16 DIAGNOSIS — E78.5 DYSLIPIDEMIA: Primary | ICD-10-CM

## 2021-04-16 DIAGNOSIS — K21.9 GASTRIC REFLUX: ICD-10-CM

## 2021-04-16 DIAGNOSIS — F41.9 ANXIETY: ICD-10-CM

## 2021-04-16 PROCEDURE — 3008F BODY MASS INDEX DOCD: CPT | Mod: CPTII,S$GLB,, | Performed by: FAMILY MEDICINE

## 2021-04-16 PROCEDURE — 99214 PR OFFICE/OUTPT VISIT, EST, LEVL IV, 30-39 MIN: ICD-10-PCS | Mod: S$GLB,,, | Performed by: FAMILY MEDICINE

## 2021-04-16 PROCEDURE — 99214 OFFICE O/P EST MOD 30 MIN: CPT | Mod: S$GLB,,, | Performed by: FAMILY MEDICINE

## 2021-04-16 PROCEDURE — 1126F PR PAIN SEVERITY QUANTIFIED, NO PAIN PRESENT: ICD-10-PCS | Mod: S$GLB,,, | Performed by: FAMILY MEDICINE

## 2021-04-16 PROCEDURE — 1126F AMNT PAIN NOTED NONE PRSNT: CPT | Mod: S$GLB,,, | Performed by: FAMILY MEDICINE

## 2021-04-16 PROCEDURE — 3008F PR BODY MASS INDEX (BMI) DOCUMENTED: ICD-10-PCS | Mod: CPTII,S$GLB,, | Performed by: FAMILY MEDICINE

## 2021-04-16 RX ORDER — AMITRIPTYLINE HYDROCHLORIDE 10 MG/1
10 TABLET, FILM COATED ORAL NIGHTLY
Qty: 90 TABLET | Refills: 1 | Status: SHIPPED | OUTPATIENT
Start: 2021-04-16 | End: 2021-10-01 | Stop reason: SDUPTHER

## 2021-04-16 RX ORDER — TOPIRAMATE 25 MG/1
25 TABLET ORAL 2 TIMES DAILY
Qty: 180 TABLET | Refills: 1 | Status: SHIPPED | OUTPATIENT
Start: 2021-04-16 | End: 2021-11-10 | Stop reason: SDUPTHER

## 2021-04-16 RX ORDER — SPIRONOLACTONE 25 MG/1
25 TABLET ORAL DAILY
Qty: 90 TABLET | Refills: 1 | Status: SHIPPED | OUTPATIENT
Start: 2021-04-16 | End: 2021-11-10 | Stop reason: SDUPTHER

## 2021-04-16 RX ORDER — BUTALBITAL, ACETAMINOPHEN AND CAFFEINE 50; 325; 40 MG/1; MG/1; MG/1
1 TABLET ORAL EVERY 6 HOURS PRN
Qty: 30 TABLET | Refills: 0
Start: 2021-04-16 | End: 2021-11-10 | Stop reason: SDUPTHER

## 2021-04-16 RX ORDER — PANTOPRAZOLE SODIUM 40 MG/1
40 TABLET, DELAYED RELEASE ORAL DAILY
Qty: 90 TABLET | Refills: 1 | Status: SHIPPED | OUTPATIENT
Start: 2021-04-16 | End: 2021-10-01 | Stop reason: SDUPTHER

## 2021-04-19 ENCOUNTER — PATIENT OUTREACH (OUTPATIENT)
Dept: ADMINISTRATIVE | Facility: HOSPITAL | Age: 46
End: 2021-04-19

## 2021-07-07 ENCOUNTER — PATIENT MESSAGE (OUTPATIENT)
Dept: ADMINISTRATIVE | Facility: HOSPITAL | Age: 46
End: 2021-07-07

## 2021-07-14 DIAGNOSIS — M79.672 LEFT FOOT PAIN: Primary | ICD-10-CM

## 2021-07-22 ENCOUNTER — HOSPITAL ENCOUNTER (OUTPATIENT)
Dept: RADIOLOGY | Facility: HOSPITAL | Age: 46
Discharge: HOME | End: 2021-07-22
Attending: ORTHOPAEDIC SURGERY
Payer: COMMERCIAL

## 2021-07-22 ENCOUNTER — OFFICE VISIT (OUTPATIENT)
Dept: ORTHOPEDICS | Facility: CLINIC | Age: 46
End: 2021-07-22
Payer: COMMERCIAL

## 2021-07-22 VITALS — RESPIRATION RATE: 17 BRPM | HEIGHT: 63 IN | BODY MASS INDEX: 31.18 KG/M2 | WEIGHT: 176 LBS

## 2021-07-22 DIAGNOSIS — M76.822 POSTERIOR TIBIAL TENDINITIS OF LEFT LEG: Primary | ICD-10-CM

## 2021-07-22 DIAGNOSIS — M79.672 LEFT FOOT PAIN: ICD-10-CM

## 2021-07-22 DIAGNOSIS — M25.572 PAIN OF JOINT OF LEFT ANKLE AND FOOT: ICD-10-CM

## 2021-07-22 PROCEDURE — 1125F AMNT PAIN NOTED PAIN PRSNT: CPT | Mod: CPTII,S$GLB,, | Performed by: ORTHOPAEDIC SURGERY

## 2021-07-22 PROCEDURE — 1125F PR PAIN SEVERITY QUANTIFIED, PAIN PRESENT: ICD-10-PCS | Mod: CPTII,S$GLB,, | Performed by: ORTHOPAEDIC SURGERY

## 2021-07-22 PROCEDURE — 73630 X-RAY EXAM OF FOOT: CPT | Mod: TC,PN,LT

## 2021-07-22 PROCEDURE — 99213 PR OFFICE/OUTPT VISIT, EST, LEVL III, 20-29 MIN: ICD-10-PCS | Mod: S$GLB,,, | Performed by: ORTHOPAEDIC SURGERY

## 2021-07-22 PROCEDURE — 73630 X-RAY EXAM OF FOOT: CPT | Mod: 26,LT,, | Performed by: RADIOLOGY

## 2021-07-22 PROCEDURE — 99999 PR PBB SHADOW E&M-EST. PATIENT-LVL III: ICD-10-PCS | Mod: PBBFAC,,, | Performed by: ORTHOPAEDIC SURGERY

## 2021-07-22 PROCEDURE — 3008F PR BODY MASS INDEX (BMI) DOCUMENTED: ICD-10-PCS | Mod: CPTII,S$GLB,, | Performed by: ORTHOPAEDIC SURGERY

## 2021-07-22 PROCEDURE — 99999 PR PBB SHADOW E&M-EST. PATIENT-LVL III: CPT | Mod: PBBFAC,,, | Performed by: ORTHOPAEDIC SURGERY

## 2021-07-22 PROCEDURE — 73630 XR FOOT COMPLETE 3 VIEW LEFT: ICD-10-PCS | Mod: 26,LT,, | Performed by: RADIOLOGY

## 2021-07-22 PROCEDURE — 3008F BODY MASS INDEX DOCD: CPT | Mod: CPTII,S$GLB,, | Performed by: ORTHOPAEDIC SURGERY

## 2021-07-22 PROCEDURE — 99213 OFFICE O/P EST LOW 20 MIN: CPT | Mod: S$GLB,,, | Performed by: ORTHOPAEDIC SURGERY

## 2021-07-22 RX ORDER — PREDNISONE 20 MG/1
TABLET ORAL
COMMUNITY
End: 2021-07-29

## 2021-07-22 RX ORDER — SILVER SULFADIAZINE 10 G/1000G
CREAM TOPICAL
COMMUNITY
End: 2021-11-09

## 2021-07-22 RX ORDER — DICYCLOMINE HYDROCHLORIDE 10 MG/1
CAPSULE ORAL
COMMUNITY
End: 2021-11-09

## 2021-07-26 ENCOUNTER — HOSPITAL ENCOUNTER (OUTPATIENT)
Dept: RADIOLOGY | Facility: HOSPITAL | Age: 46
Discharge: HOME | End: 2021-07-26
Attending: ORTHOPAEDIC SURGERY
Payer: COMMERCIAL

## 2021-07-26 DIAGNOSIS — M25.572 PAIN OF JOINT OF LEFT ANKLE AND FOOT: ICD-10-CM

## 2021-07-26 PROCEDURE — 73721 MRI JNT OF LWR EXTRE W/O DYE: CPT | Mod: 26,LT,, | Performed by: RADIOLOGY

## 2021-07-26 PROCEDURE — 73721 MRI ANKLE WITHOUT CONTRAST LEFT: ICD-10-PCS | Mod: 26,LT,, | Performed by: RADIOLOGY

## 2021-07-26 PROCEDURE — 73721 MRI JNT OF LWR EXTRE W/O DYE: CPT | Mod: TC,LT

## 2021-07-29 ENCOUNTER — OFFICE VISIT (OUTPATIENT)
Dept: ORTHOPEDICS | Facility: CLINIC | Age: 46
End: 2021-07-29
Payer: COMMERCIAL

## 2021-07-29 VITALS — BODY MASS INDEX: 31.18 KG/M2 | WEIGHT: 176 LBS | RESPIRATION RATE: 18 BRPM | HEIGHT: 63 IN

## 2021-07-29 DIAGNOSIS — M76.822 POSTERIOR TIBIAL TENDINITIS OF LEFT LEG: Primary | ICD-10-CM

## 2021-07-29 PROCEDURE — 1159F PR MEDICATION LIST DOCUMENTED IN MEDICAL RECORD: ICD-10-PCS | Mod: CPTII,S$GLB,, | Performed by: ORTHOPAEDIC SURGERY

## 2021-07-29 PROCEDURE — 99213 PR OFFICE/OUTPT VISIT, EST, LEVL III, 20-29 MIN: ICD-10-PCS | Mod: S$GLB,,, | Performed by: ORTHOPAEDIC SURGERY

## 2021-07-29 PROCEDURE — 1159F MED LIST DOCD IN RCRD: CPT | Mod: CPTII,S$GLB,, | Performed by: ORTHOPAEDIC SURGERY

## 2021-07-29 PROCEDURE — 99213 OFFICE O/P EST LOW 20 MIN: CPT | Mod: S$GLB,,, | Performed by: ORTHOPAEDIC SURGERY

## 2021-07-29 PROCEDURE — 1160F PR REVIEW ALL MEDS BY PRESCRIBER/CLIN PHARMACIST DOCUMENTED: ICD-10-PCS | Mod: CPTII,S$GLB,, | Performed by: ORTHOPAEDIC SURGERY

## 2021-07-29 PROCEDURE — 99999 PR PBB SHADOW E&M-EST. PATIENT-LVL III: CPT | Mod: PBBFAC,,, | Performed by: ORTHOPAEDIC SURGERY

## 2021-07-29 PROCEDURE — 3008F BODY MASS INDEX DOCD: CPT | Mod: CPTII,S$GLB,, | Performed by: ORTHOPAEDIC SURGERY

## 2021-07-29 PROCEDURE — 99999 PR PBB SHADOW E&M-EST. PATIENT-LVL III: ICD-10-PCS | Mod: PBBFAC,,, | Performed by: ORTHOPAEDIC SURGERY

## 2021-07-29 PROCEDURE — 3008F PR BODY MASS INDEX (BMI) DOCUMENTED: ICD-10-PCS | Mod: CPTII,S$GLB,, | Performed by: ORTHOPAEDIC SURGERY

## 2021-07-29 PROCEDURE — 1160F RVW MEDS BY RX/DR IN RCRD: CPT | Mod: CPTII,S$GLB,, | Performed by: ORTHOPAEDIC SURGERY

## 2021-08-04 DIAGNOSIS — M25.572 PAIN OF JOINT OF LEFT ANKLE AND FOOT: Primary | ICD-10-CM

## 2021-08-10 ENCOUNTER — HOSPITAL ENCOUNTER (OUTPATIENT)
Dept: RADIOLOGY | Facility: HOSPITAL | Age: 46
Discharge: HOME | End: 2021-08-10
Attending: ORTHOPAEDIC SURGERY
Payer: COMMERCIAL

## 2021-08-10 ENCOUNTER — OFFICE VISIT (OUTPATIENT)
Dept: ORTHOPEDICS | Facility: CLINIC | Age: 46
End: 2021-08-10
Payer: COMMERCIAL

## 2021-08-10 VITALS
WEIGHT: 175.94 LBS | DIASTOLIC BLOOD PRESSURE: 83 MMHG | SYSTOLIC BLOOD PRESSURE: 140 MMHG | HEIGHT: 63 IN | BODY MASS INDEX: 31.17 KG/M2 | HEART RATE: 85 BPM

## 2021-08-10 DIAGNOSIS — M79.672 LEFT FOOT PAIN: Primary | ICD-10-CM

## 2021-08-10 DIAGNOSIS — M25.572 PAIN OF JOINT OF LEFT ANKLE AND FOOT: ICD-10-CM

## 2021-08-10 DIAGNOSIS — M79.672 LEFT FOOT PAIN: ICD-10-CM

## 2021-08-10 PROCEDURE — 3079F DIAST BP 80-89 MM HG: CPT | Mod: CPTII,S$GLB,, | Performed by: ORTHOPAEDIC SURGERY

## 2021-08-10 PROCEDURE — 1159F MED LIST DOCD IN RCRD: CPT | Mod: CPTII,S$GLB,, | Performed by: ORTHOPAEDIC SURGERY

## 2021-08-10 PROCEDURE — 1125F PR PAIN SEVERITY QUANTIFIED, PAIN PRESENT: ICD-10-PCS | Mod: CPTII,S$GLB,, | Performed by: ORTHOPAEDIC SURGERY

## 2021-08-10 PROCEDURE — 3079F PR MOST RECENT DIASTOLIC BLOOD PRESSURE 80-89 MM HG: ICD-10-PCS | Mod: CPTII,S$GLB,, | Performed by: ORTHOPAEDIC SURGERY

## 2021-08-10 PROCEDURE — 3008F BODY MASS INDEX DOCD: CPT | Mod: CPTII,S$GLB,, | Performed by: ORTHOPAEDIC SURGERY

## 2021-08-10 PROCEDURE — 73610 XR ANKLE COMPLETE 3 VIEW LEFT: ICD-10-PCS | Mod: 26,LT,, | Performed by: RADIOLOGY

## 2021-08-10 PROCEDURE — 97760 PR ORTHOTIC MGMT&TRAINJ INITIAL ENC EA 15 MINS: ICD-10-PCS | Mod: S$GLB,,, | Performed by: ORTHOPAEDIC SURGERY

## 2021-08-10 PROCEDURE — 73650 XR CALCANEUS 2 VIEW LEFT: ICD-10-PCS | Mod: 26,LT,, | Performed by: RADIOLOGY

## 2021-08-10 PROCEDURE — 3077F SYST BP >= 140 MM HG: CPT | Mod: CPTII,S$GLB,, | Performed by: ORTHOPAEDIC SURGERY

## 2021-08-10 PROCEDURE — 1159F PR MEDICATION LIST DOCUMENTED IN MEDICAL RECORD: ICD-10-PCS | Mod: CPTII,S$GLB,, | Performed by: ORTHOPAEDIC SURGERY

## 2021-08-10 PROCEDURE — 99999 PR PBB SHADOW E&M-EST. PATIENT-LVL III: CPT | Mod: PBBFAC,,, | Performed by: ORTHOPAEDIC SURGERY

## 2021-08-10 PROCEDURE — 99204 OFFICE O/P NEW MOD 45 MIN: CPT | Mod: S$GLB,,, | Performed by: ORTHOPAEDIC SURGERY

## 2021-08-10 PROCEDURE — 99204 PR OFFICE/OUTPT VISIT, NEW, LEVL IV, 45-59 MIN: ICD-10-PCS | Mod: S$GLB,,, | Performed by: ORTHOPAEDIC SURGERY

## 2021-08-10 PROCEDURE — 73610 X-RAY EXAM OF ANKLE: CPT | Mod: 26,LT,, | Performed by: RADIOLOGY

## 2021-08-10 PROCEDURE — 1160F RVW MEDS BY RX/DR IN RCRD: CPT | Mod: CPTII,S$GLB,, | Performed by: ORTHOPAEDIC SURGERY

## 2021-08-10 PROCEDURE — 73650 X-RAY EXAM OF HEEL: CPT | Mod: TC,PO,LT

## 2021-08-10 PROCEDURE — 1160F PR REVIEW ALL MEDS BY PRESCRIBER/CLIN PHARMACIST DOCUMENTED: ICD-10-PCS | Mod: CPTII,S$GLB,, | Performed by: ORTHOPAEDIC SURGERY

## 2021-08-10 PROCEDURE — 3008F PR BODY MASS INDEX (BMI) DOCUMENTED: ICD-10-PCS | Mod: CPTII,S$GLB,, | Performed by: ORTHOPAEDIC SURGERY

## 2021-08-10 PROCEDURE — 1125F AMNT PAIN NOTED PAIN PRSNT: CPT | Mod: CPTII,S$GLB,, | Performed by: ORTHOPAEDIC SURGERY

## 2021-08-10 PROCEDURE — 3077F PR MOST RECENT SYSTOLIC BLOOD PRESSURE >= 140 MM HG: ICD-10-PCS | Mod: CPTII,S$GLB,, | Performed by: ORTHOPAEDIC SURGERY

## 2021-08-10 PROCEDURE — 97760 ORTHOTIC MGMT&TRAING 1ST ENC: CPT | Mod: S$GLB,,, | Performed by: ORTHOPAEDIC SURGERY

## 2021-08-10 PROCEDURE — 99999 PR PBB SHADOW E&M-EST. PATIENT-LVL III: ICD-10-PCS | Mod: PBBFAC,,, | Performed by: ORTHOPAEDIC SURGERY

## 2021-08-10 PROCEDURE — 73650 X-RAY EXAM OF HEEL: CPT | Mod: 26,LT,, | Performed by: RADIOLOGY

## 2021-08-10 PROCEDURE — 73610 X-RAY EXAM OF ANKLE: CPT | Mod: TC,PO,LT

## 2021-09-27 ENCOUNTER — TELEPHONE (OUTPATIENT)
Dept: ORTHOPEDICS | Facility: CLINIC | Age: 46
End: 2021-09-27

## 2021-10-01 RX ORDER — PANTOPRAZOLE SODIUM 40 MG/1
40 TABLET, DELAYED RELEASE ORAL DAILY
Qty: 30 TABLET | Refills: 0 | Status: SHIPPED | OUTPATIENT
Start: 2021-10-01 | End: 2021-11-10 | Stop reason: SDUPTHER

## 2021-10-01 RX ORDER — AMITRIPTYLINE HYDROCHLORIDE 10 MG/1
10 TABLET, FILM COATED ORAL NIGHTLY
Qty: 30 TABLET | Refills: 0 | Status: SHIPPED | OUTPATIENT
Start: 2021-10-01 | End: 2021-11-10 | Stop reason: SDUPTHER

## 2021-10-05 ENCOUNTER — PATIENT MESSAGE (OUTPATIENT)
Dept: ADMINISTRATIVE | Facility: HOSPITAL | Age: 46
End: 2021-10-05

## 2021-10-08 ENCOUNTER — TELEPHONE (OUTPATIENT)
Dept: ORTHOPEDICS | Facility: CLINIC | Age: 46
End: 2021-10-08

## 2021-11-09 ENCOUNTER — OFFICE VISIT (OUTPATIENT)
Dept: ORTHOPEDICS | Facility: CLINIC | Age: 46
End: 2021-11-09
Payer: COMMERCIAL

## 2021-11-09 VITALS
SYSTOLIC BLOOD PRESSURE: 130 MMHG | BODY MASS INDEX: 31.01 KG/M2 | HEIGHT: 63 IN | DIASTOLIC BLOOD PRESSURE: 81 MMHG | WEIGHT: 175 LBS | HEART RATE: 70 BPM

## 2021-11-09 DIAGNOSIS — M76.822 POSTERIOR TIBIAL TENDINITIS OF LEFT LEG: Primary | ICD-10-CM

## 2021-11-09 DIAGNOSIS — M79.672 PAIN ASSOCIATED WITH ACCESSORY NAVICULAR BONE OF LEFT FOOT: ICD-10-CM

## 2021-11-09 DIAGNOSIS — Q74.2 PAIN ASSOCIATED WITH ACCESSORY NAVICULAR BONE OF LEFT FOOT: ICD-10-CM

## 2021-11-09 PROCEDURE — 1160F RVW MEDS BY RX/DR IN RCRD: CPT | Mod: CPTII,S$GLB,, | Performed by: ORTHOPAEDIC SURGERY

## 2021-11-09 PROCEDURE — 99999 PR PBB SHADOW E&M-EST. PATIENT-LVL III: ICD-10-PCS | Mod: PBBFAC,,, | Performed by: ORTHOPAEDIC SURGERY

## 2021-11-09 PROCEDURE — 3079F PR MOST RECENT DIASTOLIC BLOOD PRESSURE 80-89 MM HG: ICD-10-PCS | Mod: CPTII,S$GLB,, | Performed by: ORTHOPAEDIC SURGERY

## 2021-11-09 PROCEDURE — 3075F PR MOST RECENT SYSTOLIC BLOOD PRESS GE 130-139MM HG: ICD-10-PCS | Mod: CPTII,S$GLB,, | Performed by: ORTHOPAEDIC SURGERY

## 2021-11-09 PROCEDURE — 1159F PR MEDICATION LIST DOCUMENTED IN MEDICAL RECORD: ICD-10-PCS | Mod: CPTII,S$GLB,, | Performed by: ORTHOPAEDIC SURGERY

## 2021-11-09 PROCEDURE — 99999 PR PBB SHADOW E&M-EST. PATIENT-LVL III: CPT | Mod: PBBFAC,,, | Performed by: ORTHOPAEDIC SURGERY

## 2021-11-09 PROCEDURE — 3075F SYST BP GE 130 - 139MM HG: CPT | Mod: CPTII,S$GLB,, | Performed by: ORTHOPAEDIC SURGERY

## 2021-11-09 PROCEDURE — 3008F PR BODY MASS INDEX (BMI) DOCUMENTED: ICD-10-PCS | Mod: CPTII,S$GLB,, | Performed by: ORTHOPAEDIC SURGERY

## 2021-11-09 PROCEDURE — 99214 OFFICE O/P EST MOD 30 MIN: CPT | Mod: S$GLB,,, | Performed by: ORTHOPAEDIC SURGERY

## 2021-11-09 PROCEDURE — 1159F MED LIST DOCD IN RCRD: CPT | Mod: CPTII,S$GLB,, | Performed by: ORTHOPAEDIC SURGERY

## 2021-11-09 PROCEDURE — 99214 PR OFFICE/OUTPT VISIT, EST, LEVL IV, 30-39 MIN: ICD-10-PCS | Mod: S$GLB,,, | Performed by: ORTHOPAEDIC SURGERY

## 2021-11-09 PROCEDURE — 3008F BODY MASS INDEX DOCD: CPT | Mod: CPTII,S$GLB,, | Performed by: ORTHOPAEDIC SURGERY

## 2021-11-09 PROCEDURE — 1160F PR REVIEW ALL MEDS BY PRESCRIBER/CLIN PHARMACIST DOCUMENTED: ICD-10-PCS | Mod: CPTII,S$GLB,, | Performed by: ORTHOPAEDIC SURGERY

## 2021-11-09 PROCEDURE — 3079F DIAST BP 80-89 MM HG: CPT | Mod: CPTII,S$GLB,, | Performed by: ORTHOPAEDIC SURGERY

## 2021-11-10 ENCOUNTER — OFFICE VISIT (OUTPATIENT)
Dept: FAMILY MEDICINE | Facility: CLINIC | Age: 46
End: 2021-11-10
Payer: COMMERCIAL

## 2021-11-10 VITALS
HEART RATE: 70 BPM | HEIGHT: 63 IN | TEMPERATURE: 97 F | BODY MASS INDEX: 30.3 KG/M2 | SYSTOLIC BLOOD PRESSURE: 131 MMHG | OXYGEN SATURATION: 98 % | DIASTOLIC BLOOD PRESSURE: 80 MMHG | WEIGHT: 171 LBS

## 2021-11-10 DIAGNOSIS — K21.9 GASTROESOPHAGEAL REFLUX DISEASE, UNSPECIFIED WHETHER ESOPHAGITIS PRESENT: Primary | ICD-10-CM

## 2021-11-10 DIAGNOSIS — G43.109 MIGRAINE WITH AURA AND WITHOUT STATUS MIGRAINOSUS, NOT INTRACTABLE: ICD-10-CM

## 2021-11-10 DIAGNOSIS — E78.5 DYSLIPIDEMIA: ICD-10-CM

## 2021-11-10 DIAGNOSIS — F41.9 ANXIETY: ICD-10-CM

## 2021-11-10 PROCEDURE — 3075F PR MOST RECENT SYSTOLIC BLOOD PRESS GE 130-139MM HG: ICD-10-PCS | Mod: CPTII,S$GLB,, | Performed by: FAMILY MEDICINE

## 2021-11-10 PROCEDURE — 3008F BODY MASS INDEX DOCD: CPT | Mod: CPTII,S$GLB,, | Performed by: FAMILY MEDICINE

## 2021-11-10 PROCEDURE — 99214 OFFICE O/P EST MOD 30 MIN: CPT | Mod: S$GLB,,, | Performed by: FAMILY MEDICINE

## 2021-11-10 PROCEDURE — 1160F PR REVIEW ALL MEDS BY PRESCRIBER/CLIN PHARMACIST DOCUMENTED: ICD-10-PCS | Mod: CPTII,S$GLB,, | Performed by: FAMILY MEDICINE

## 2021-11-10 PROCEDURE — 3079F PR MOST RECENT DIASTOLIC BLOOD PRESSURE 80-89 MM HG: ICD-10-PCS | Mod: CPTII,S$GLB,, | Performed by: FAMILY MEDICINE

## 2021-11-10 PROCEDURE — 99214 PR OFFICE/OUTPT VISIT, EST, LEVL IV, 30-39 MIN: ICD-10-PCS | Mod: S$GLB,,, | Performed by: FAMILY MEDICINE

## 2021-11-10 PROCEDURE — 1159F MED LIST DOCD IN RCRD: CPT | Mod: CPTII,S$GLB,, | Performed by: FAMILY MEDICINE

## 2021-11-10 PROCEDURE — 1160F RVW MEDS BY RX/DR IN RCRD: CPT | Mod: CPTII,S$GLB,, | Performed by: FAMILY MEDICINE

## 2021-11-10 PROCEDURE — 3075F SYST BP GE 130 - 139MM HG: CPT | Mod: CPTII,S$GLB,, | Performed by: FAMILY MEDICINE

## 2021-11-10 PROCEDURE — 1159F PR MEDICATION LIST DOCUMENTED IN MEDICAL RECORD: ICD-10-PCS | Mod: CPTII,S$GLB,, | Performed by: FAMILY MEDICINE

## 2021-11-10 PROCEDURE — 3008F PR BODY MASS INDEX (BMI) DOCUMENTED: ICD-10-PCS | Mod: CPTII,S$GLB,, | Performed by: FAMILY MEDICINE

## 2021-11-10 PROCEDURE — 3079F DIAST BP 80-89 MM HG: CPT | Mod: CPTII,S$GLB,, | Performed by: FAMILY MEDICINE

## 2021-11-10 RX ORDER — AMITRIPTYLINE HYDROCHLORIDE 10 MG/1
10 TABLET, FILM COATED ORAL NIGHTLY
Qty: 90 TABLET | Refills: 1 | Status: SHIPPED | OUTPATIENT
Start: 2021-11-10 | End: 2022-05-31 | Stop reason: SDUPTHER

## 2021-11-10 RX ORDER — TOPIRAMATE 25 MG/1
25 TABLET ORAL 2 TIMES DAILY
Qty: 180 TABLET | Refills: 1 | Status: SHIPPED | OUTPATIENT
Start: 2021-11-10 | End: 2021-12-20

## 2021-11-10 RX ORDER — SPIRONOLACTONE 25 MG/1
25 TABLET ORAL DAILY
Qty: 90 TABLET | Refills: 1 | Status: SHIPPED | OUTPATIENT
Start: 2021-11-10 | End: 2022-05-31 | Stop reason: SDUPTHER

## 2021-11-10 RX ORDER — ALPRAZOLAM 0.25 MG/1
0.25 TABLET ORAL DAILY PRN
Qty: 30 TABLET | Refills: 2 | Status: SHIPPED | OUTPATIENT
Start: 2021-11-10 | End: 2023-06-22 | Stop reason: SDUPTHER

## 2021-11-10 RX ORDER — BUTALBITAL, ACETAMINOPHEN AND CAFFEINE 50; 325; 40 MG/1; MG/1; MG/1
1 TABLET ORAL EVERY 6 HOURS PRN
Qty: 60 TABLET | Refills: 0 | Status: SHIPPED | OUTPATIENT
Start: 2021-11-10 | End: 2023-06-22 | Stop reason: SDUPTHER

## 2021-11-10 RX ORDER — PANTOPRAZOLE SODIUM 40 MG/1
40 TABLET, DELAYED RELEASE ORAL DAILY
Qty: 90 TABLET | Refills: 1 | Status: SHIPPED | OUTPATIENT
Start: 2021-11-10 | End: 2022-05-31 | Stop reason: SDUPTHER

## 2021-11-10 RX ORDER — MUPIROCIN 20 MG/G
OINTMENT TOPICAL
Status: CANCELLED | OUTPATIENT
Start: 2021-11-10

## 2021-11-17 ENCOUNTER — TELEPHONE (OUTPATIENT)
Dept: ORTHOPEDICS | Facility: CLINIC | Age: 46
End: 2021-11-17
Payer: COMMERCIAL

## 2021-11-17 ENCOUNTER — PATIENT OUTREACH (OUTPATIENT)
Dept: ADMINISTRATIVE | Facility: HOSPITAL | Age: 46
End: 2021-11-17
Payer: COMMERCIAL

## 2021-12-07 ENCOUNTER — ANESTHESIA EVENT (OUTPATIENT)
Dept: SURGERY | Facility: HOSPITAL | Age: 46
End: 2021-12-07
Payer: COMMERCIAL

## 2021-12-07 RX ORDER — CHOLECALCIFEROL (VITAMIN D3) 25 MCG
2000 TABLET ORAL DAILY
COMMUNITY
End: 2022-05-31

## 2021-12-08 ENCOUNTER — HOSPITAL ENCOUNTER (OUTPATIENT)
Dept: RADIOLOGY | Facility: HOSPITAL | Age: 46
Discharge: HOME | End: 2021-12-08
Attending: ORTHOPAEDIC SURGERY | Admitting: ORTHOPAEDIC SURGERY
Payer: COMMERCIAL

## 2021-12-08 ENCOUNTER — HOSPITAL ENCOUNTER (OUTPATIENT)
Facility: HOSPITAL | Age: 46
Discharge: HOME | End: 2021-12-08
Attending: ORTHOPAEDIC SURGERY | Admitting: ORTHOPAEDIC SURGERY
Payer: COMMERCIAL

## 2021-12-08 ENCOUNTER — ANESTHESIA (OUTPATIENT)
Dept: SURGERY | Facility: HOSPITAL | Age: 46
End: 2021-12-08
Payer: COMMERCIAL

## 2021-12-08 DIAGNOSIS — M76.822 POSTERIOR TIBIAL TENDINITIS OF LEFT LEG: ICD-10-CM

## 2021-12-08 DIAGNOSIS — Q74.2 PAIN ASSOCIATED WITH ACCESSORY NAVICULAR BONE OF LEFT FOOT: Primary | ICD-10-CM

## 2021-12-08 DIAGNOSIS — M79.672 PAIN ASSOCIATED WITH ACCESSORY NAVICULAR BONE OF LEFT FOOT: Primary | ICD-10-CM

## 2021-12-08 DIAGNOSIS — M79.605 LEFT LEG PAIN: ICD-10-CM

## 2021-12-08 LAB
B-HCG UR QL: NEGATIVE
CTP QC/QA: YES

## 2021-12-08 PROCEDURE — 25000003 PHARM REV CODE 250: Mod: PO | Performed by: ORTHOPAEDIC SURGERY

## 2021-12-08 PROCEDURE — 28238 PR RECONST POST TIB TEND,EXCIS ACC TAR NAV: ICD-10-PCS | Mod: LT,,, | Performed by: ORTHOPAEDIC SURGERY

## 2021-12-08 PROCEDURE — 25000003 PHARM REV CODE 250: Mod: PO | Performed by: NURSE ANESTHETIST, CERTIFIED REGISTERED

## 2021-12-08 PROCEDURE — D9220A PRA ANESTHESIA: Mod: ANES,,, | Performed by: ANESTHESIOLOGY

## 2021-12-08 PROCEDURE — 64450 PR NERVE BLOCK INJ, ANES/STEROID, OTHER PERIPHERAL: ICD-10-PCS | Mod: 59,LT,, | Performed by: ANESTHESIOLOGY

## 2021-12-08 PROCEDURE — 64450 NJX AA&/STRD OTHER PN/BRANCH: CPT | Mod: 59,LT,, | Performed by: ANESTHESIOLOGY

## 2021-12-08 PROCEDURE — 63600175 PHARM REV CODE 636 W HCPCS: Mod: PO | Performed by: NURSE ANESTHETIST, CERTIFIED REGISTERED

## 2021-12-08 PROCEDURE — 28238 REVISION OF FOOT TENDON: CPT | Mod: LT,,, | Performed by: ORTHOPAEDIC SURGERY

## 2021-12-08 PROCEDURE — D9220A PRA ANESTHESIA: ICD-10-PCS | Mod: ANES,,, | Performed by: ANESTHESIOLOGY

## 2021-12-08 PROCEDURE — D9220A PRA ANESTHESIA: ICD-10-PCS | Mod: CRNA,,, | Performed by: NURSE ANESTHETIST, CERTIFIED REGISTERED

## 2021-12-08 PROCEDURE — 37000008 HC ANESTHESIA 1ST 15 MINUTES: Mod: PO | Performed by: ORTHOPAEDIC SURGERY

## 2021-12-08 PROCEDURE — 76942 ECHO GUIDE FOR BIOPSY: CPT | Mod: PO | Performed by: ANESTHESIOLOGY

## 2021-12-08 PROCEDURE — 36000708 HC OR TIME LEV III 1ST 15 MIN: Mod: PO | Performed by: ORTHOPAEDIC SURGERY

## 2021-12-08 PROCEDURE — 76942 PR U/S GUIDANCE FOR NEEDLE GUIDANCE: ICD-10-PCS | Mod: 26,,, | Performed by: ANESTHESIOLOGY

## 2021-12-08 PROCEDURE — C1713 ANCHOR/SCREW BN/BN,TIS/BN: HCPCS | Mod: PO | Performed by: ORTHOPAEDIC SURGERY

## 2021-12-08 PROCEDURE — 36000709 HC OR TIME LEV III EA ADD 15 MIN: Mod: PO | Performed by: ORTHOPAEDIC SURGERY

## 2021-12-08 PROCEDURE — 25000003 PHARM REV CODE 250: Mod: PO | Performed by: ANESTHESIOLOGY

## 2021-12-08 PROCEDURE — 76942 ECHO GUIDE FOR BIOPSY: CPT | Mod: 26,,, | Performed by: ANESTHESIOLOGY

## 2021-12-08 PROCEDURE — 63600175 PHARM REV CODE 636 W HCPCS: Mod: PO | Performed by: ANESTHESIOLOGY

## 2021-12-08 PROCEDURE — 71000033 HC RECOVERY, INTIAL HOUR: Mod: PO | Performed by: ORTHOPAEDIC SURGERY

## 2021-12-08 PROCEDURE — 27200750 HC INSULATED NEEDLE/ STIMUPLEX: Mod: PO | Performed by: ANESTHESIOLOGY

## 2021-12-08 PROCEDURE — 27201423 OPTIME MED/SURG SUP & DEVICES STERILE SUPPLY: Mod: PO | Performed by: ORTHOPAEDIC SURGERY

## 2021-12-08 PROCEDURE — C9290 INJ, BUPIVACAINE LIPOSOME: HCPCS | Mod: PO | Performed by: ANESTHESIOLOGY

## 2021-12-08 PROCEDURE — D9220A PRA ANESTHESIA: Mod: CRNA,,, | Performed by: NURSE ANESTHETIST, CERTIFIED REGISTERED

## 2021-12-08 PROCEDURE — 76000 FLUOROSCOPY <1 HR PHYS/QHP: CPT | Mod: TC,PO

## 2021-12-08 PROCEDURE — 81025 URINE PREGNANCY TEST: CPT | Mod: PO | Performed by: ORTHOPAEDIC SURGERY

## 2021-12-08 PROCEDURE — 37000009 HC ANESTHESIA EA ADD 15 MINS: Mod: PO | Performed by: ORTHOPAEDIC SURGERY

## 2021-12-08 DEVICE — IMPLANTABLE DEVICE: Type: IMPLANTABLE DEVICE | Site: FOOT | Status: FUNCTIONAL

## 2021-12-08 RX ORDER — MUPIROCIN 20 MG/G
OINTMENT TOPICAL
Status: DISCONTINUED | OUTPATIENT
Start: 2021-12-08 | End: 2021-12-08 | Stop reason: HOSPADM

## 2021-12-08 RX ORDER — ACETAMINOPHEN 10 MG/ML
INJECTION, SOLUTION INTRAVENOUS
Status: DISCONTINUED | OUTPATIENT
Start: 2021-12-08 | End: 2021-12-08

## 2021-12-08 RX ORDER — SODIUM CHLORIDE 0.9 % (FLUSH) 0.9 %
10 SYRINGE (ML) INJECTION
Status: DISCONTINUED | OUTPATIENT
Start: 2021-12-08 | End: 2021-12-08 | Stop reason: HOSPADM

## 2021-12-08 RX ORDER — ROCURONIUM BROMIDE 10 MG/ML
INJECTION, SOLUTION INTRAVENOUS
Status: DISCONTINUED | OUTPATIENT
Start: 2021-12-08 | End: 2021-12-08

## 2021-12-08 RX ORDER — LIDOCAINE HYDROCHLORIDE 10 MG/ML
1 INJECTION, SOLUTION EPIDURAL; INFILTRATION; INTRACAUDAL; PERINEURAL ONCE
Status: COMPLETED | OUTPATIENT
Start: 2021-12-08 | End: 2021-12-08

## 2021-12-08 RX ORDER — OXYCODONE AND ACETAMINOPHEN 10; 325 MG/1; MG/1
1 TABLET ORAL EVERY 4 HOURS PRN
Qty: 24 TABLET | Refills: 0 | Status: SHIPPED | OUTPATIENT
Start: 2021-12-08 | End: 2021-12-20

## 2021-12-08 RX ORDER — PROPOFOL 10 MG/ML
VIAL (ML) INTRAVENOUS
Status: DISCONTINUED | OUTPATIENT
Start: 2021-12-08 | End: 2021-12-08

## 2021-12-08 RX ORDER — DIPHENHYDRAMINE HYDROCHLORIDE 50 MG/ML
INJECTION INTRAMUSCULAR; INTRAVENOUS
Status: DISCONTINUED | OUTPATIENT
Start: 2021-12-08 | End: 2021-12-08

## 2021-12-08 RX ORDER — NEOSTIGMINE METHYLSULFATE 1 MG/ML
INJECTION, SOLUTION INTRAVENOUS
Status: DISCONTINUED | OUTPATIENT
Start: 2021-12-08 | End: 2021-12-08

## 2021-12-08 RX ORDER — DEXAMETHASONE SODIUM PHOSPHATE 4 MG/ML
INJECTION, SOLUTION INTRA-ARTICULAR; INTRALESIONAL; INTRAMUSCULAR; INTRAVENOUS; SOFT TISSUE
Status: DISCONTINUED | OUTPATIENT
Start: 2021-12-08 | End: 2021-12-08

## 2021-12-08 RX ORDER — ONDANSETRON 4 MG/1
8 TABLET, ORALLY DISINTEGRATING ORAL EVERY 8 HOURS PRN
Qty: 12 TABLET | Refills: 1 | Status: SHIPPED | OUTPATIENT
Start: 2021-12-08 | End: 2021-12-20

## 2021-12-08 RX ORDER — ONDANSETRON 2 MG/ML
INJECTION INTRAMUSCULAR; INTRAVENOUS
Status: DISCONTINUED | OUTPATIENT
Start: 2021-12-08 | End: 2021-12-08

## 2021-12-08 RX ORDER — BUPIVACAINE HYDROCHLORIDE 5 MG/ML
INJECTION, SOLUTION EPIDURAL; INTRACAUDAL
Status: DISCONTINUED | OUTPATIENT
Start: 2021-12-08 | End: 2021-12-08

## 2021-12-08 RX ORDER — SODIUM CHLORIDE 0.9 G/100ML
IRRIGANT IRRIGATION
Status: DISCONTINUED | OUTPATIENT
Start: 2021-12-08 | End: 2021-12-08 | Stop reason: HOSPADM

## 2021-12-08 RX ORDER — LIDOCAINE HYDROCHLORIDE 20 MG/ML
INJECTION INTRAVENOUS
Status: DISCONTINUED | OUTPATIENT
Start: 2021-12-08 | End: 2021-12-08

## 2021-12-08 RX ORDER — CLINDAMYCIN PHOSPHATE 900 MG/50ML
900 INJECTION, SOLUTION INTRAVENOUS
Status: COMPLETED | OUTPATIENT
Start: 2021-12-08 | End: 2021-12-08

## 2021-12-08 RX ORDER — SODIUM CHLORIDE, SODIUM LACTATE, POTASSIUM CHLORIDE, CALCIUM CHLORIDE 600; 310; 30; 20 MG/100ML; MG/100ML; MG/100ML; MG/100ML
INJECTION, SOLUTION INTRAVENOUS CONTINUOUS
Status: DISCONTINUED | OUTPATIENT
Start: 2021-12-08 | End: 2021-12-08 | Stop reason: HOSPADM

## 2021-12-08 RX ORDER — MIDAZOLAM HYDROCHLORIDE 1 MG/ML
INJECTION, SOLUTION INTRAMUSCULAR; INTRAVENOUS
Status: DISCONTINUED | OUTPATIENT
Start: 2021-12-08 | End: 2021-12-08

## 2021-12-08 RX ORDER — FENTANYL CITRATE 50 UG/ML
INJECTION, SOLUTION INTRAMUSCULAR; INTRAVENOUS
Status: DISCONTINUED | OUTPATIENT
Start: 2021-12-08 | End: 2021-12-08

## 2021-12-08 RX ORDER — FENTANYL CITRATE 50 UG/ML
25 INJECTION, SOLUTION INTRAMUSCULAR; INTRAVENOUS EVERY 5 MIN PRN
Status: DISCONTINUED | OUTPATIENT
Start: 2021-12-08 | End: 2021-12-08 | Stop reason: HOSPADM

## 2021-12-08 RX ORDER — METOCLOPRAMIDE HYDROCHLORIDE 5 MG/ML
10 INJECTION INTRAMUSCULAR; INTRAVENOUS EVERY 10 MIN PRN
Status: DISCONTINUED | OUTPATIENT
Start: 2021-12-08 | End: 2021-12-08 | Stop reason: HOSPADM

## 2021-12-08 RX ORDER — OXYCODONE HYDROCHLORIDE 5 MG/1
5 TABLET ORAL
Status: DISCONTINUED | OUTPATIENT
Start: 2021-12-08 | End: 2021-12-08 | Stop reason: HOSPADM

## 2021-12-08 RX ADMIN — FENTANYL CITRATE 50 MCG: 50 INJECTION, SOLUTION INTRAMUSCULAR; INTRAVENOUS at 07:12

## 2021-12-08 RX ADMIN — FENTANYL CITRATE 50 MCG: 50 INJECTION, SOLUTION INTRAMUSCULAR; INTRAVENOUS at 08:12

## 2021-12-08 RX ADMIN — PROPOFOL 150 MG: 10 INJECTION, EMULSION INTRAVENOUS at 07:12

## 2021-12-08 RX ADMIN — SODIUM CHLORIDE, SODIUM LACTATE, POTASSIUM CHLORIDE, AND CALCIUM CHLORIDE: .6; .31; .03; .02 INJECTION, SOLUTION INTRAVENOUS at 06:12

## 2021-12-08 RX ADMIN — FENTANYL CITRATE 50 MCG: 50 INJECTION, SOLUTION INTRAMUSCULAR; INTRAVENOUS at 06:12

## 2021-12-08 RX ADMIN — CLINDAMYCIN IN 5 PERCENT DEXTROSE 900 MG: 18 INJECTION, SOLUTION INTRAVENOUS at 06:12

## 2021-12-08 RX ADMIN — MUPIROCIN: 20 OINTMENT TOPICAL at 06:12

## 2021-12-08 RX ADMIN — BUPIVACAINE 20 ML: 13.3 INJECTION, SUSPENSION, LIPOSOMAL INFILTRATION at 06:12

## 2021-12-08 RX ADMIN — NEOSTIGMINE METHYLSULFATE 3 MG: 1 INJECTION INTRAVENOUS at 07:12

## 2021-12-08 RX ADMIN — BUPIVACAINE HYDROCHLORIDE 10 ML: 5 INJECTION, SOLUTION EPIDURAL; INTRACAUDAL; PERINEURAL at 06:12

## 2021-12-08 RX ADMIN — LIDOCAINE HYDROCHLORIDE 10 MG: 10 INJECTION, SOLUTION EPIDURAL; INFILTRATION; INTRACAUDAL; PERINEURAL at 06:12

## 2021-12-08 RX ADMIN — ACETAMINOPHEN 1000 MG: 10 INJECTION, SOLUTION INTRAVENOUS at 07:12

## 2021-12-08 RX ADMIN — LIDOCAINE HYDROCHLORIDE 75 MG: 20 INJECTION, SOLUTION INTRAVENOUS at 07:12

## 2021-12-08 RX ADMIN — MIDAZOLAM HYDROCHLORIDE 2 MG: 1 INJECTION, SOLUTION INTRAMUSCULAR; INTRAVENOUS at 06:12

## 2021-12-08 RX ADMIN — DEXAMETHASONE SODIUM PHOSPHATE 8 MG: 4 INJECTION, SOLUTION INTRAMUSCULAR; INTRAVENOUS at 07:12

## 2021-12-08 RX ADMIN — GLYCOPYRROLATE 0.4 MG: 0.2 INJECTION, SOLUTION INTRAMUSCULAR; INTRAVITREAL at 07:12

## 2021-12-08 RX ADMIN — ONDANSETRON 4 MG: 2 INJECTION INTRAMUSCULAR; INTRAVENOUS at 07:12

## 2021-12-08 RX ADMIN — ROCURONIUM BROMIDE 30 MG: 10 INJECTION, SOLUTION INTRAVENOUS at 07:12

## 2021-12-08 RX ADMIN — DIPHENHYDRAMINE HYDROCHLORIDE 25 MG: 50 INJECTION INTRAMUSCULAR; INTRAVENOUS at 07:12

## 2021-12-09 VITALS
WEIGHT: 175 LBS | OXYGEN SATURATION: 99 % | HEART RATE: 75 BPM | HEIGHT: 63 IN | RESPIRATION RATE: 12 BRPM | TEMPERATURE: 98 F | SYSTOLIC BLOOD PRESSURE: 127 MMHG | DIASTOLIC BLOOD PRESSURE: 75 MMHG | BODY MASS INDEX: 31.01 KG/M2

## 2021-12-20 ENCOUNTER — OFFICE VISIT (OUTPATIENT)
Dept: ORTHOPEDICS | Facility: CLINIC | Age: 46
End: 2021-12-20
Payer: COMMERCIAL

## 2021-12-20 ENCOUNTER — HOSPITAL ENCOUNTER (OUTPATIENT)
Dept: RADIOLOGY | Facility: HOSPITAL | Age: 46
Discharge: HOME | End: 2021-12-20
Attending: ORTHOPAEDIC SURGERY
Payer: COMMERCIAL

## 2021-12-20 VITALS
BODY MASS INDEX: 31.01 KG/M2 | HEART RATE: 89 BPM | SYSTOLIC BLOOD PRESSURE: 141 MMHG | DIASTOLIC BLOOD PRESSURE: 79 MMHG | HEIGHT: 63 IN | WEIGHT: 175 LBS

## 2021-12-20 DIAGNOSIS — Q74.2 PAIN ASSOCIATED WITH ACCESSORY NAVICULAR BONE OF LEFT FOOT: ICD-10-CM

## 2021-12-20 DIAGNOSIS — M79.672 PAIN ASSOCIATED WITH ACCESSORY NAVICULAR BONE OF LEFT FOOT: Primary | ICD-10-CM

## 2021-12-20 DIAGNOSIS — M79.672 PAIN ASSOCIATED WITH ACCESSORY NAVICULAR BONE OF LEFT FOOT: ICD-10-CM

## 2021-12-20 DIAGNOSIS — Q74.2 PAIN ASSOCIATED WITH ACCESSORY NAVICULAR BONE OF LEFT FOOT: Primary | ICD-10-CM

## 2021-12-20 PROCEDURE — 99999 PR PBB SHADOW E&M-EST. PATIENT-LVL III: ICD-10-PCS | Mod: PBBFAC,,, | Performed by: ORTHOPAEDIC SURGERY

## 2021-12-20 PROCEDURE — 73630 XR FOOT COMPLETE 3 VIEW LEFT: ICD-10-PCS | Mod: 26,LT,, | Performed by: RADIOLOGY

## 2021-12-20 PROCEDURE — 73630 X-RAY EXAM OF FOOT: CPT | Mod: TC,PO,LT

## 2021-12-20 PROCEDURE — 29405 APPL SHORT LEG CAST: CPT | Mod: 58,LT,S$GLB, | Performed by: ORTHOPAEDIC SURGERY

## 2021-12-20 PROCEDURE — 29405 PR APPLY SHORT LEG CAST: ICD-10-PCS | Mod: 58,LT,S$GLB, | Performed by: ORTHOPAEDIC SURGERY

## 2021-12-20 PROCEDURE — 99999 PR PBB SHADOW E&M-EST. PATIENT-LVL III: CPT | Mod: PBBFAC,,, | Performed by: ORTHOPAEDIC SURGERY

## 2021-12-20 PROCEDURE — 73630 X-RAY EXAM OF FOOT: CPT | Mod: 26,LT,, | Performed by: RADIOLOGY

## 2021-12-20 PROCEDURE — 99024 PR POST-OP FOLLOW-UP VISIT: ICD-10-PCS | Mod: S$GLB,,, | Performed by: ORTHOPAEDIC SURGERY

## 2021-12-20 PROCEDURE — 99024 POSTOP FOLLOW-UP VISIT: CPT | Mod: S$GLB,,, | Performed by: ORTHOPAEDIC SURGERY

## 2022-01-04 ENCOUNTER — OFFICE VISIT (OUTPATIENT)
Dept: ORTHOPEDICS | Facility: CLINIC | Age: 47
End: 2022-01-04
Payer: COMMERCIAL

## 2022-01-04 VITALS
SYSTOLIC BLOOD PRESSURE: 137 MMHG | HEART RATE: 71 BPM | HEIGHT: 63 IN | WEIGHT: 175.06 LBS | DIASTOLIC BLOOD PRESSURE: 70 MMHG | BODY MASS INDEX: 31.02 KG/M2

## 2022-01-04 DIAGNOSIS — M79.672 PAIN ASSOCIATED WITH ACCESSORY NAVICULAR BONE OF LEFT FOOT: Primary | ICD-10-CM

## 2022-01-04 DIAGNOSIS — Q74.2 PAIN ASSOCIATED WITH ACCESSORY NAVICULAR BONE OF LEFT FOOT: Primary | ICD-10-CM

## 2022-01-04 PROCEDURE — 99024 PR POST-OP FOLLOW-UP VISIT: ICD-10-PCS | Mod: S$GLB,,, | Performed by: ORTHOPAEDIC SURGERY

## 2022-01-04 PROCEDURE — 1160F RVW MEDS BY RX/DR IN RCRD: CPT | Mod: CPTII,S$GLB,, | Performed by: ORTHOPAEDIC SURGERY

## 2022-01-04 PROCEDURE — 1159F MED LIST DOCD IN RCRD: CPT | Mod: CPTII,S$GLB,, | Performed by: ORTHOPAEDIC SURGERY

## 2022-01-04 PROCEDURE — 3075F PR MOST RECENT SYSTOLIC BLOOD PRESS GE 130-139MM HG: ICD-10-PCS | Mod: CPTII,S$GLB,, | Performed by: ORTHOPAEDIC SURGERY

## 2022-01-04 PROCEDURE — 1159F PR MEDICATION LIST DOCUMENTED IN MEDICAL RECORD: ICD-10-PCS | Mod: CPTII,S$GLB,, | Performed by: ORTHOPAEDIC SURGERY

## 2022-01-04 PROCEDURE — 3075F SYST BP GE 130 - 139MM HG: CPT | Mod: CPTII,S$GLB,, | Performed by: ORTHOPAEDIC SURGERY

## 2022-01-04 PROCEDURE — 3078F DIAST BP <80 MM HG: CPT | Mod: CPTII,S$GLB,, | Performed by: ORTHOPAEDIC SURGERY

## 2022-01-04 PROCEDURE — 3008F PR BODY MASS INDEX (BMI) DOCUMENTED: ICD-10-PCS | Mod: CPTII,S$GLB,, | Performed by: ORTHOPAEDIC SURGERY

## 2022-01-04 PROCEDURE — 29405 PR APPLY SHORT LEG CAST: ICD-10-PCS | Mod: 58,LT,S$GLB, | Performed by: ORTHOPAEDIC SURGERY

## 2022-01-04 PROCEDURE — 99999 PR PBB SHADOW E&M-EST. PATIENT-LVL III: ICD-10-PCS | Mod: PBBFAC,,, | Performed by: ORTHOPAEDIC SURGERY

## 2022-01-04 PROCEDURE — 99024 POSTOP FOLLOW-UP VISIT: CPT | Mod: S$GLB,,, | Performed by: ORTHOPAEDIC SURGERY

## 2022-01-04 PROCEDURE — 1160F PR REVIEW ALL MEDS BY PRESCRIBER/CLIN PHARMACIST DOCUMENTED: ICD-10-PCS | Mod: CPTII,S$GLB,, | Performed by: ORTHOPAEDIC SURGERY

## 2022-01-04 PROCEDURE — 29405 APPL SHORT LEG CAST: CPT | Mod: 58,LT,S$GLB, | Performed by: ORTHOPAEDIC SURGERY

## 2022-01-04 PROCEDURE — 3078F PR MOST RECENT DIASTOLIC BLOOD PRESSURE < 80 MM HG: ICD-10-PCS | Mod: CPTII,S$GLB,, | Performed by: ORTHOPAEDIC SURGERY

## 2022-01-04 PROCEDURE — 99999 PR PBB SHADOW E&M-EST. PATIENT-LVL III: CPT | Mod: PBBFAC,,, | Performed by: ORTHOPAEDIC SURGERY

## 2022-01-04 PROCEDURE — 3008F BODY MASS INDEX DOCD: CPT | Mod: CPTII,S$GLB,, | Performed by: ORTHOPAEDIC SURGERY

## 2022-01-04 NOTE — PROGRESS NOTES
Subjective:      Patient ID: Genet Martinez is a 46 y.o. female.    Chief Complaint: Post-op Evaluation of the Left Foot (DOS 12/08/2021 excision accessory navicular, Mod Kidner with repair and advance PTT)    Pt is doing very well today. She rates her pain as 0/10 today.     Social History     Occupational History    Not on file   Tobacco Use    Smoking status: Never Smoker    Smokeless tobacco: Never Used   Substance and Sexual Activity    Alcohol use: No    Drug use: No    Sexual activity: Not on file          Objective:    Ortho Exam     LLE:  Neurovascularly intact, incision healing well, minimal swelling.  No signs of infection. Minimal  tenderness to palpation.        Assessment:     Post-op Evaluation and Pain of the Left Foot ( Excision of Left prominent/accessory navicular, modified Kidner procedure with repair and advancement of the posterior tibial tendon.DOS- 12/08/2021)      Plan:       Short leg fiberglass cast applied with foot in inversion.  Non-weightbearing. F/u 2 weeks, no xray.

## 2022-01-05 NOTE — NURSING
Dr. Devi ordered left short leg cast to be removed. Cast removal explained to patient. Patient applied goggles. Left short leg cast intact.Left short leg cast removed without difficulty using the cast saw. Incisions intact. No skin irritation noted. Patient tolerated cast removal well.  Dr. Devi ordered short leg cast to be applied to left lower extremity. Left short leg cast applied to extremity without any problems. Patient tolerated application of cast well. Patient able to move left toes freely, cap refill less than 3 seconds. Instructed patient on cast care to include non weight bearing to extremity and elevation of extremity. Cast is not able to get wet. If cast gets wet patient must call the office to come in for cast change. If it is after hours or on the weekend patient to go to ER to have cast removed. Patient to call for an appointment to have cast reapplied on next business day. Do not stick anything inside cast as it could open the skin causing a wound and/or infection. Continue to elevate affected extremity above the heart to allow for swelling to resolve. Patient to call the office if cast starts to rub or becomes loose on lower extremity. Patient stated understanding.

## 2022-01-18 ENCOUNTER — OFFICE VISIT (OUTPATIENT)
Dept: ORTHOPEDICS | Facility: CLINIC | Age: 47
End: 2022-01-18
Payer: COMMERCIAL

## 2022-01-18 VITALS
BODY MASS INDEX: 31.02 KG/M2 | DIASTOLIC BLOOD PRESSURE: 79 MMHG | HEART RATE: 73 BPM | SYSTOLIC BLOOD PRESSURE: 143 MMHG | HEIGHT: 63 IN | WEIGHT: 175.06 LBS

## 2022-01-18 DIAGNOSIS — M79.672 PAIN ASSOCIATED WITH ACCESSORY NAVICULAR BONE OF LEFT FOOT: Primary | ICD-10-CM

## 2022-01-18 DIAGNOSIS — Q74.2 PAIN ASSOCIATED WITH ACCESSORY NAVICULAR BONE OF LEFT FOOT: Primary | ICD-10-CM

## 2022-01-18 PROCEDURE — 3008F PR BODY MASS INDEX (BMI) DOCUMENTED: ICD-10-PCS | Mod: CPTII,S$GLB,, | Performed by: ORTHOPAEDIC SURGERY

## 2022-01-18 PROCEDURE — 99024 PR POST-OP FOLLOW-UP VISIT: ICD-10-PCS | Mod: S$GLB,,, | Performed by: ORTHOPAEDIC SURGERY

## 2022-01-18 PROCEDURE — 3077F SYST BP >= 140 MM HG: CPT | Mod: CPTII,S$GLB,, | Performed by: ORTHOPAEDIC SURGERY

## 2022-01-18 PROCEDURE — 1160F PR REVIEW ALL MEDS BY PRESCRIBER/CLIN PHARMACIST DOCUMENTED: ICD-10-PCS | Mod: CPTII,S$GLB,, | Performed by: ORTHOPAEDIC SURGERY

## 2022-01-18 PROCEDURE — 3077F PR MOST RECENT SYSTOLIC BLOOD PRESSURE >= 140 MM HG: ICD-10-PCS | Mod: CPTII,S$GLB,, | Performed by: ORTHOPAEDIC SURGERY

## 2022-01-18 PROCEDURE — 99999 PR PBB SHADOW E&M-EST. PATIENT-LVL III: CPT | Mod: PBBFAC,,, | Performed by: ORTHOPAEDIC SURGERY

## 2022-01-18 PROCEDURE — 3078F PR MOST RECENT DIASTOLIC BLOOD PRESSURE < 80 MM HG: ICD-10-PCS | Mod: CPTII,S$GLB,, | Performed by: ORTHOPAEDIC SURGERY

## 2022-01-18 PROCEDURE — 99024 POSTOP FOLLOW-UP VISIT: CPT | Mod: S$GLB,,, | Performed by: ORTHOPAEDIC SURGERY

## 2022-01-18 PROCEDURE — 1160F RVW MEDS BY RX/DR IN RCRD: CPT | Mod: CPTII,S$GLB,, | Performed by: ORTHOPAEDIC SURGERY

## 2022-01-18 PROCEDURE — 1159F PR MEDICATION LIST DOCUMENTED IN MEDICAL RECORD: ICD-10-PCS | Mod: CPTII,S$GLB,, | Performed by: ORTHOPAEDIC SURGERY

## 2022-01-18 PROCEDURE — 97760 ORTHOTIC MGMT&TRAING 1ST ENC: CPT | Mod: S$GLB,,, | Performed by: ORTHOPAEDIC SURGERY

## 2022-01-18 PROCEDURE — 97760 PR ORTHOTIC MGMT&TRAINJ INITIAL ENC EA 15 MINS: ICD-10-PCS | Mod: S$GLB,,, | Performed by: ORTHOPAEDIC SURGERY

## 2022-01-18 PROCEDURE — 3008F BODY MASS INDEX DOCD: CPT | Mod: CPTII,S$GLB,, | Performed by: ORTHOPAEDIC SURGERY

## 2022-01-18 PROCEDURE — 1159F MED LIST DOCD IN RCRD: CPT | Mod: CPTII,S$GLB,, | Performed by: ORTHOPAEDIC SURGERY

## 2022-01-18 PROCEDURE — 3078F DIAST BP <80 MM HG: CPT | Mod: CPTII,S$GLB,, | Performed by: ORTHOPAEDIC SURGERY

## 2022-01-18 PROCEDURE — 99999 PR PBB SHADOW E&M-EST. PATIENT-LVL III: ICD-10-PCS | Mod: PBBFAC,,, | Performed by: ORTHOPAEDIC SURGERY

## 2022-01-18 NOTE — PROGRESS NOTES
Subjective:      Patient ID: Genet Martinez is a 46 y.o. female.    Chief Complaint: Post-op Evaluation of the Left Foot (DOS 12/08/2021 Excision accessory navicular, Mod [place], repair and advance PT)    Pt is doing very well today. She rates her pain as 0/10 today.     Social History     Occupational History    Not on file   Tobacco Use    Smoking status: Never Smoker    Smokeless tobacco: Never Used   Substance and Sexual Activity    Alcohol use: No    Drug use: No    Sexual activity: Not on file          Objective:    Ortho Exam     LLE:  Neurovascularly intact, incision well healed, minimal swelling.  No signs of infection. Minimal tenderness to palpation. Decreased ROM of the foot and ankle.     Assessment:     Post-op Evaluation and Pain of the Left Foot ( Excision of Left prominent/accessory navicular, modified Kidner procedure with repair and advancement of the posterior tibial tendon.DOS- 12/08/2021)      Plan:       We performed a custom orthotic/brace adjustment, fitting and training with the patient today. The patient demonstrated understanding and proper care. This was performed for 15 minutes. Short boot was given. Weight bearing as tolerated in boot. PT 2/6. Ok to transition to a tennis shoe after 2 weeks. F/u 4 weeks no xray.

## 2022-01-18 NOTE — NURSING
Dr. Devi ordered left short leg cast to be removed. Cast removal explained to patient. Patient applied goggles. Left short leg cast intact. Left short leg cast removed without difficulty using the cast saw. Incisions intact. No skin irritation noted. Patient tolerated cast removal well.

## 2022-01-19 ENCOUNTER — CLINICAL SUPPORT (OUTPATIENT)
Dept: REHABILITATION | Facility: HOSPITAL | Age: 47
End: 2022-01-19
Attending: ORTHOPAEDIC SURGERY
Payer: COMMERCIAL

## 2022-01-19 DIAGNOSIS — R26.9 GAIT ABNORMALITY: ICD-10-CM

## 2022-01-19 DIAGNOSIS — R29.898 DECREASED STRENGTH OF LOWER EXTREMITY: ICD-10-CM

## 2022-01-19 DIAGNOSIS — M79.672 PAIN ASSOCIATED WITH ACCESSORY NAVICULAR BONE OF LEFT FOOT: ICD-10-CM

## 2022-01-19 DIAGNOSIS — Q74.2 PAIN ASSOCIATED WITH ACCESSORY NAVICULAR BONE OF LEFT FOOT: ICD-10-CM

## 2022-01-19 DIAGNOSIS — M25.672 DECREASED RANGE OF MOTION OF LEFT ANKLE: ICD-10-CM

## 2022-01-19 PROCEDURE — 97110 THERAPEUTIC EXERCISES: CPT | Mod: PN

## 2022-01-19 PROCEDURE — 97161 PT EVAL LOW COMPLEX 20 MIN: CPT | Mod: PN

## 2022-01-19 NOTE — PLAN OF CARE
[facility]  Physical Therapy Initial Evaluation    Name: Genet Martinez  Clinic Number: [id number]    Therapy Diagnosis:   Encounter Diagnoses   Name Primary?   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  Evaluation Date: 1/19/2022  Authorization Period Expiration: 12/31/2022  Plan of Care Expiration: 3/30/2022  Progress Note Due: 2/21/2022  Visit # / Visits authorized: 1/1   FOTO: 1/3     Date of Surgery: 12/08/2021  Return to MD date: 2/15/2022    Precautions: Standard and Weightbearing as tolerated in boot (x2 weeks and then progress out of boot to tennis shoe)  - Note from referral: No dry needling, ok to transition to shoe in 2 weeks     Procedures: Excision of Left prominent/accessory navicular, modified Kidner procedure with repair and advancement of the posterior tibial tendon    Time In: 13:00 pm  Time Out: 13:47 pm   Total Appointment Time (timed & untimed codes): 47 minutes      SUBJECTIVE   Date of onset: Surgery on 12/08/2021    History of current condition - Gwen reports: She has been dealing with pain in her left foot for about 3 years now and she did try physical therapy prior which helped for a good 6-8 months but the pain came back. She reports that her job requires her to stand all day and was having difficulty with that so she finally went to the doctor who performed a MRI and said she had an extra bone and tear in her tendon. She then had surgery on December 8, 2021 to repair the tear and remove the bone. She saw the doctor yesterday and had her case removed and then was placed in the boot. Se report that  since getting the cast off she has some pain on the top of her foot and the sides. She reports that she walked for the first time yesterday with the boot and had some numbness and tingling. She wears a compression sock under her boot and that helps. She feels better being on her foot than being off of her foot like when she sits. She still has difficulty going from sit to stand and with walking but feels better since having the surgery than before the surgery.   She denies any pertinent past medical history.     Imaging:  X-ray (2021):  FINDINGS:  The bones are osteopenic.  There is bunion formation at the great toe metatarsal head.  There is a bipartite lateral sesamoid bone beneath the great toe metatarsal.  There is Achilles insertion calcaneal enthesophyte formation and plantar calcaneal spur formation.  There is metatarsus adductus.  There is soft tissue swelling over the dorsum of the forefoot.  No radiographically evident acute, displaced fracture, dislocation, or osseous destructive process.    Prior Therapy: Yes, prior to surgery for same issue   Social History: Lives in a home with her    Occupation: Pharmacy tech    Prior Level of Function: Pain with walking but able to walk with no issues and completing ADLs without issues.   Current Level of Function: Difficulty with driving, ADLs, and walking.     Pain:  Current 3/10, worst 7/10, best 3/10   Location: Left ankle  Description: Tingling   Aggravating Factors: Sitting, Standing, Walking and Getting out of bed/chair  Easing Factors: being out of the boot     Patients goals: To be able to get her range of motion back and return to walking and work without issues.       Medical History:   Past Medical History:   Diagnosis Date    Bilateral patent pressure equalization (PE) tubes     as a child       Surgical History:   Genet Martinez  has a past surgical history that includes  section; Tubal ligation; Tympanostomy tube placement;  [place] tooth extraction; Reconstruction of posterior tibial tendon with surgical removal of accessory navicular bone of foot (Left, 12/8/2021); and Repair of tendon of lower extremity (Left, 12/8/2021).    Medications:   Genet has a current medication list which includes the following prescription(s): alprazolam, amitriptyline, butalbital-acetaminophen-caffeine -40 mg, pantoprazole, spironolactone, vitamin d, and vortioxetine.    Allergies:   Review of patient's allergies indicates:   Allergen Reactions    Penicillins Hives          OBJECTIVE     Observation: Patient presents to clinic with overall pleasant general affect and does not appear to be in any acute distress.     Gait: Patient ambulates with boot on left lower extremity and bilateral axillary crutches. Patient displays decreased rowdy, step length, and decreased hip and knee flexion.     Incision: Healing nicely with no signs of infection.     Range of Motion:    Right  Left    Ankle Plantarflexion 55 degrees  30 degrees    Ankle Dorsiflexion 18 degrees  5 degrees of plantarflexion   Ankle Inversion 30 degrees  5 degrees    Ankle Eversion  16 degrees  8 degrees      Lower Extremity Strength (MMT):   Right  Left    Hip Flexion 4+/5 4/5   Hip Abduction 4/5 4-/5   Knee Flexion 5/5 4/5   Knee Extension 5/5 4+/5   Ankle Dorsiflexion 5/5 NT secondary to post-op status    Ankle Plantarflexion 5/5 NT secondary to post-op status   Ankle Inversion 5/5 NT secondary to post-op status    Ankle Eversion  5/5 NT secondary to post-op status    Foot Intrinsics (DIP) 4+/5 3+/5   *ankle plantarflexion strength assessed in modified position seated edge of mat. To be assessed with calf raises in follow-up visits.     Joint Mobility: decreased as expected with post-op status    Palpation: slight tenderness to palpation around dorsum of foot.     Edema Measurements:   Figure-8 in centimeters   Right  51 cm   Left  54.5 cm        Limitation/Restriction for FOTO Ankle  Survey    Therapist reviewed FOTO scores for Genet Martinez on 1/19/2022.   FOTO documents entered into [employer] - see Media section.    Limitation Score: 48 %         TREATMENT     Total Treatment time (time-based codes) separate from Evaluation: 12 minutes      Gwen received the treatments listed below:      therapeutic exercises to develop strength, endurance, ROM, flexibility and posture for 12 minutes including:    Pt education on HEP, prognosis, PT POC, signs of infection   Ankle pumps 1x20 reps   Sidelying clamshells with RedTB 2x10 reps   Short arches (talar doming) 1x10 with 5 sec holds     manual therapy techniques: Joint mobilizations and Soft tissue Mobilization were applied to the: L ankle for 0 minutes, including:    neuromuscular re-education activities to improve: Balance, Coordination, Kinesthetic, Sense, Proprioception and Posture for 0 minutes. The following activities were included:    therapeutic activities to improve functional performance for 0 minutes, including:    gait training to improve functional mobility and safety for 0 minutes, including:      PATIENT EDUCATION AND HOME EXERCISES     Education provided:   - HEP, PT POC, signs of infection     Written Home Exercises Provided: yes. Exercises were reviewed and Gwen was able to demonstrate them prior to the end of the session.  Gwen demonstrated good  understanding of the education provided. See EMR under Patient Instructions for exercises provided during therapy sessions.    ASSESSMENT     Genet is a 46 y.o. 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 Patient presents with signs and symptoms consistent with post-op status. Patient presents with decreased range of motion, decreased lower extremity strength, edema, gait abnormality, and functional limitations with her ADLs/job duties/standing/walking.  Patient would  benefit from skilled physical therapy to address above stated deficits and improve overall functional mobility. Patient educated on home exercise program and signs of infection.     Patient prognosis is Good.   Patientt will benefit from skilled outpatient Physical Therapy to address the deficits stated above and in the chart below, provide patient /family education, and to maximize patientt's level of independence.     Plan of care discussed with patient: Yes  Patient's spiritual, cultural and educational needs considered and patient is agreeable to the plan of care and goals as stated below:     Anticipated Barriers for therapy: None at this time     Medical Necessity is demonstrated by the following  History  Co-morbidities and personal factors that may impact the plan of care Co-morbidities:   anxiety, high BMI, migraines, GERD and dylipidemia    Personal Factors:   no deficits     moderate   Examination  Body Structures and Functions, activity limitations and participation restrictions that may impact the plan of care Body Regions:   lower extremities    Body Systems:    ROM  strength  balance  gait  transitions  motor control  motor learning    Participation Restrictions:   Difficulty with standing  Difficulty with ambulation     Activity limitations:   Learning and applying knowledge  no deficits    General Tasks and Commands  no deficits    Communication  no deficits    Mobility  lifting and carrying objects  walking  driving (bike, car, motorcycle)    Self care  no deficits    Domestic Life  shopping  cooking  doing house work (cleaning house, washing dishes, laundry)    Interactions/Relationships  no deficits    Life Areas  no deficits    Community and Social Life  community life  recreation and leisure         high   Clinical Presentation stable and uncomplicated low   Decision Making/ Complexity Score: low     Goals:  Short Term Goals: 4-6 weeks   1. Patient will be independent in initial HEP to help  supplement PT.   2. Patient will improve left ankle dorsiflexion range of motion to 5 degrees of dorsiflexion to help with gait mechanics.   3. Patient will be able to transition out of boot to shoe and demonstrate improved gait mechanics reporting </= 2/10 pain to help with ADLs.   4. Patient will report no pain at rest to demonstrate improvement in condition and quality of life.    Long Term Goals: 8-10 weeks   1. Patient will be independent in updated HEP to help supplement PT and modulate symptoms.   2. Patient will improve FOTO limitation score to </= 31% limited to show improvement in condition.   3. Patient will be able to stand for an hour with no pain to help with return to work.  4. Patient will improve left ankle range of motion to within 5 degrees of right to help with return to job duties and improved gait mechanics.   5. Patient will improve ankle strength to >/= to 4/5 to help with return to work and activities of daily living.     PLAN   Plan of care Certification: 1/19/2022 to 3/30/2022.    Outpatient Physical Therapy 2 times weekly for 10 weeks to include the following interventions: Electrical Stimulation, Gait Training, Manual Therapy, Moist Heat/ Ice, Neuromuscular Re-ed, Patient Education, Therapeutic Activities and Therapeutic Exercise.     Grecia Tellez, PT  Co-treated by: Ovidio Arnold PT      I CERTIFY THE NEED FOR THESE SERVICES FURNISHED UNDER THIS PLAN OF TREATMENT AND WHILE UNDER MY CARE   Physician's comments:     Physician's Signature: ___________________________________________________

## 2022-01-24 ENCOUNTER — CLINICAL SUPPORT (OUTPATIENT)
Dept: REHABILITATION | Facility: HOSPITAL | Age: 47
End: 2022-01-24
Attending: ORTHOPAEDIC SURGERY
Payer: COMMERCIAL

## 2022-01-24 DIAGNOSIS — M25.672 DECREASED RANGE OF MOTION OF LEFT ANKLE: ICD-10-CM

## 2022-01-24 DIAGNOSIS — R26.9 GAIT ABNORMALITY: ICD-10-CM

## 2022-01-24 DIAGNOSIS — R29.898 DECREASED STRENGTH OF LOWER EXTREMITY: ICD-10-CM

## 2022-01-24 PROCEDURE — 97110 THERAPEUTIC EXERCISES: CPT | Mod: PN

## 2022-01-24 PROCEDURE — 97140 MANUAL THERAPY 1/> REGIONS: CPT | Mod: PN

## 2022-01-24 NOTE — PROGRESS NOTES
[facility]   Physical Therapy Treatment Note     Name: Genet Martinez  Clinic Number: [id number]    Therapy Diagnosis: No diagnosis found.  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  Evaluation Date: 1/19/2022  Authorization Period Expiration: 12/31/2022  Plan of Care Expiration: 3/30/2022  Progress Note Due: 2/21/2022  Visit # / Visits authorized: 1/20   FOTO: 1/3      Date of Surgery: 12/08/2021  Return to MD date: 2/15/2022     Precautions: Standard and Weightbearing as tolerated in boot (x2 weeks and then progress out of boot to tennis shoe)  - Note from referral: No dry needling, ok to transition to shoe in 2 weeks      Procedures: Excision of Left prominent/accessory navicular, modified Kidner procedure with repair and advancement of the posterior tibial tendon       PTA Visit #: 0/5     FOTO first follow up:   FOTO second follow up:     Time In: 1218  Time Out: 1301  Total Appointment Time (timed & untimed codes): 43 minutes    SUBJECTIVE     Pt reports: she had a good weekend, nothing new to report.    She was compliant with home exercise program.     Response to previous treatment: first follow up  Functional change:  First follow up     Pain: 2/10  Location: soreness on top of her foot     OBJECTIVE     Objective Measures updated at progress report unless specified.     Treatment     Gwen received the treatments listed below:      therapeutic exercises to develop strength and endurance for 23 minutes including:    Seated heel raises 3x12   Clamshells with RTB 3xmuscle burn each side  Bilateral leg press on shuttle with 50# 3x12 (in boot)  Single leg press on shuttle with 25# 3x12 (in boot)     manual therapy techniques: Joint mobilizations were applied to the: L ankle for 3 minutes,  including:    Gentle talocrural AP glides grade I-III  Gentle subtalar figure 8 mobilizations     neuromuscular re-education activities to improve: Balance, Coordination and Proprioception for 17 minutes. The following activities were included:    Talar doming x3mins   Toe yoga x3mins   Towel scrunches x3mins     Half kneeling L foot leading balance 2x1min   Half kneeling L foot leading balance eyes closed 2x1min      Patient Education and Home Exercises     Home Exercises Provided and Patient Education Provided     Education provided:   - Continue HEP     Written Home Exercises Provided: Patient instructed to cont prior HEP. Exercises were reviewed and Gwen was able to demonstrate them prior to the end of the session.  Gwen demonstrated good  understanding of the education provided. See EMR under Patient Instructions for exercises provided during therapy sessions    ASSESSMENT     Gwen tolerated first follow up visit well. She presents with improved gait pattern in boot demonstrating improved weight bearing tolerance. Focus of today's treatment was intrinsic foot strengthening and lower extremity strength training. Will continue to progress as able.     Gwen Is progressing well towards her goals.   Pt prognosis is Excellent.     Pt will continue to benefit from skilled outpatient physical therapy to address the deficits listed in the problem list box on initial evaluation, provide pt/family education and to maximize pt's level of independence in the home and community environment.     Pt's spiritual, cultural and educational needs considered and pt agreeable to plan of care and goals.     Anticipated barriers to physical therapy: none at this time     Goals:  Short Term Goals: 4-6 weeks   1. Patient will be independent in initial HEP to help supplement PT.   2. Patient will improve left ankle dorsiflexion range of motion to 5 degrees of dorsiflexion to help with gait mechanics.   3. Patient will be able to  transition out of boot to shoe and demonstrate improved gait mechanics reporting </= 2/10 pain to help with ADLs.   4. Patient will report no pain at rest to demonstrate improvement in condition and quality of life.     Long Term Goals: 8-10 weeks   1. Patient will be independent in updated HEP to help supplement PT and modulate symptoms.   2. Patient will improve FOTO limitation score to </= 31% limited to show improvement in condition.   3. Patient will be able to stand for an hour with no pain to help with return to work.  4. Patient will improve left ankle range of motion to within 5 degrees of right to help with return to job duties and improved gait mechanics.   5. Patient will improve ankle strength to >/= to 4/5 to help with return to work and activities of daily living.      PLAN   Plan of care Certification: 1/19/2022 to 3/30/2022.      Continue plan of care with focus on progressing weight bearing per MD orders, normalizing gait, improving L ankle mobility, and lower extremity strengthening.     Ovidio Arnold, PT

## 2022-01-26 ENCOUNTER — CLINICAL SUPPORT (OUTPATIENT)
Dept: REHABILITATION | Facility: HOSPITAL | Age: 47
End: 2022-01-26
Attending: ORTHOPAEDIC SURGERY
Payer: COMMERCIAL

## 2022-01-26 DIAGNOSIS — R26.9 GAIT ABNORMALITY: ICD-10-CM

## 2022-01-26 DIAGNOSIS — R29.898 DECREASED STRENGTH OF LOWER EXTREMITY: ICD-10-CM

## 2022-01-26 DIAGNOSIS — M25.672 DECREASED RANGE OF MOTION OF LEFT ANKLE: ICD-10-CM

## 2022-01-26 PROCEDURE — 97140 MANUAL THERAPY 1/> REGIONS: CPT | Mod: PN

## 2022-01-26 PROCEDURE — 97112 NEUROMUSCULAR REEDUCATION: CPT | Mod: PN

## 2022-01-26 PROCEDURE — 97110 THERAPEUTIC EXERCISES: CPT | Mod: PN

## 2022-01-26 NOTE — PROGRESS NOTES
[facility]   Physical Therapy Treatment Note     Name: Genet Martinez  Clinic Number: [id number]    Therapy Diagnosis:   Encounter Diagnoses   Name Primary?   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  Evaluation Date: 1/19/2022  Authorization Period Expiration: 12/31/2022  Plan of Care Expiration: 3/30/2022  Progress Note Due: 2/21/2022  Visit # / Visits authorized: 2/20   Total Visits: 3  FOTO: 1/3      Date of Surgery: 12/08/2021  Return to MD date: 2/15/2022     Precautions: Standard and Weightbearing as tolerated in boot (x2 weeks and then progress out of boot to tennis shoe)  - Note from referral: No dry needling, ok to transition to shoe in 2 weeks      Procedures: Excision of Left prominent/accessory navicular, modified Kidner procedure with repair and advancement of the posterior tibial tendon     PTA Visit #: 0/5     FOTO first follow up:   FOTO second follow up:     Time In: 12:14 pm  Time Out: 12:58 pm  Total Appointment Time (timed & untimed codes): 44 minutes    SUBJECTIVE     Pt reports: she is doing pretty good, she was pretty sore after last visit but feeling pretty good today.   She was compliant with home exercise program.   Response to previous treatment: Sore  Functional change:  Improved range of motion    Pain: 2/10  Location: soreness on top of her foot     OBJECTIVE     Observation 1/26/2022: Patient presents to physical therapy ambulating with CAM boot. She displays decreased step length and slight lateral lean for foot clearance. Her incision is healing nicely with no increased redness or oozing.     Left Ankle Range of Motion:  Dorsiflexion  0 degrees    Plantarflexion  35  degrees    Inversion  12 degrees    Eversion  10 degrees        Treatment   Patient is status post Posterior Tibialis repair and accessory navicular excision.     Patient is 7 weeks post-op as of 1/26/2022.     Gwen received the treatments listed below:      therapeutic exercises to develop strength and endurance for 27 minutes including:    Assessment as above   Seated heel raises 3x12   Seated wobble board PF/DF 3x10 reps through comfortable range   Clamshells with RTB 3x12 each   Bilateral leg press on shuttle with 50# 3x12 (in boot)  Single leg press on shuttle with 25# 3x12 (in boot)   Standing hip extension in boot (standing on R) 2x12 reps with RedTB  Standing hip abduction in boot (standing on R) 2x12 reps with RedTB     manual therapy techniques: Joint mobilizations were applied to the: L ankle for 8 minutes, including:    Gentle talocrural AP glides grade I-III  Gentle subtalar figure 8 mobilizations     neuromuscular re-education activities to improve: Balance, Coordination and Proprioception for 9 minutes. The following activities were included:    Talar doming x3mins   Toe yoga x3mins   Towel scrunches 3x10  Half kneeling L foot leading balance eyes closed 2x1min    Not Today:  Half kneeling L foot leading balance 2x1min     Patient Education and Home Exercises     Home Exercises Provided and Patient Education Provided     Education provided:   - Continue HEP     Written Home Exercises Provided: Patient instructed to cont prior HEP. Exercises were reviewed and wGen was able to demonstrate them prior to the end of the session.  Gwen demonstrated good  understanding of the education provided. See EMR under Patient Instructions for exercises provided during therapy sessions    ASSESSMENT     Gwen tolerated physical therapy session well with continued emphasis on lower extremity and foot intrinsic strengthening. She demonstrates improved weight tolerance in boot and improved gait mechanics in boot. She was  educated on continuation with home exercise program to continue to progress and return to PLOF. Will continue to monitor and progress as able.     Gwen Is progressing well towards her goals.   Pt prognosis is Excellent.     Pt will continue to benefit from skilled outpatient physical therapy to address the deficits listed in the problem list box on initial evaluation, provide pt/family education and to maximize pt's level of independence in the home and community environment.     Pt's spiritual, cultural and educational needs considered and pt agreeable to plan of care and goals.     Anticipated barriers to physical therapy: none at this time     Goals:   Short Term Goals: 4-6 weeks (progressing, not met)   1. Patient will be independent in initial HEP to help supplement PT.   2. Patient will improve left ankle dorsiflexion range of motion to 5 degrees of dorsiflexion to help with gait mechanics.   3. Patient will be able to transition out of boot to shoe and demonstrate improved gait mechanics reporting </= 2/10 pain to help with ADLs.   4. Patient will report no pain at rest to demonstrate improvement in condition and quality of life.    Long Term Goals: 8-10 weeks  (progressing, not met)   1. Patient will be independent in updated HEP to help supplement PT and modulate symptoms.   2. Patient will improve FOTO limitation score to </= 31% limited to show improvement in condition.   3. Patient will be able to stand for an hour with no pain to help with return to work.  4. Patient will improve left ankle range of motion to within 5 degrees of right to help with return to job duties and improved gait mechanics.   5. Patient will improve ankle strength to >/= to 4/5 to help with return to work and activities of daily living.     PLAN     Plan of Care Certification: 1/19/2022 to 3/30/2022.    Continue plan of care with focus on progressing weight bearing per MD orders, normalizing gait, improving L ankle mobility, and  lower extremity strengthening.     Grecia Tellez, PT

## 2022-01-31 ENCOUNTER — CLINICAL SUPPORT (OUTPATIENT)
Dept: REHABILITATION | Facility: HOSPITAL | Age: 47
End: 2022-01-31
Attending: ORTHOPAEDIC SURGERY
Payer: COMMERCIAL

## 2022-01-31 DIAGNOSIS — R29.898 DECREASED STRENGTH OF LOWER EXTREMITY: ICD-10-CM

## 2022-01-31 DIAGNOSIS — M25.672 DECREASED RANGE OF MOTION OF LEFT ANKLE: ICD-10-CM

## 2022-01-31 DIAGNOSIS — R26.9 GAIT ABNORMALITY: ICD-10-CM

## 2022-01-31 PROCEDURE — 97140 MANUAL THERAPY 1/> REGIONS: CPT | Mod: PN

## 2022-01-31 PROCEDURE — 97112 NEUROMUSCULAR REEDUCATION: CPT | Mod: PN

## 2022-01-31 PROCEDURE — 97110 THERAPEUTIC EXERCISES: CPT | Mod: PN

## 2022-01-31 NOTE — PROGRESS NOTES
[facility]   Physical Therapy Treatment Note     Name: Genet Martinez  Clinic Number: [id number]    Therapy Diagnosis:   Encounter Diagnoses   Name Primary?   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   Evaluation Date: 1/19/2022   Authorization Period Expiration: 12/31/2022  Plan of Care Expiration: 3/30/2022  Progress Note Due: 2/21/2022  Visit # / Visits authorized: 2/20   Total Visits: 3  FOTO: 1/3      Date of Surgery: 12/08/2021  Return to MD date: 2/15/2022     Precautions: Standard and Weightbearing as tolerated in boot (x2 weeks and then progress out of boot to tennis shoe)  - Note from referral: No dry needling, ok to transition to shoe in 2 weeks      Procedures: Excision of Left prominent/accessory navicular, modified Kidner procedure with repair and advancement of the posterior tibial tendon     PTA Visit #: 0/5     FOTO first follow up:   FOTO second follow up:     Time In: 1216   Time Out: 1300  Total Appointment Time (timed & untimed codes): 44 minutes    SUBJECTIVE     Pt reports: doing well, good following last visit, and ready to get out of boot.   She was compliant with home exercise program.   Response to previous treatment: Sore  Functional change:  Improved range of motion    Pain: 1/10  Location: soreness on top of her foot     OBJECTIVE     Observation 1/31/2022:   Left Ankle Range of Motion:  Dorsiflexion  3 degrees    Plantarflexion  35 degrees    Inversion  12 degrees    Eversion  10 degrees        Treatment   Patient is status post Posterior Tibialis repair and accessory navicular excision.     Patient is 7 weeks 5 days post-op as of 1/26/2022.     Gwen received the  treatments listed below:      therapeutic exercises to develop strength and endurance for 27 minutes including:    Assessment as above   Half kneeling Dorsiflexion mobilizations x3mins   Seated heel raises 3x12   Seated wobble board PF/DF 3x10 reps through comfortable range   Clamshells with RTB 3x12 each   Bilateral leg press on shuttle with 50# 3x12 without boot  Single leg press on shuttle with 25# 3x12 without boot   Standing hip extension in boot (standing on R) 2x12 reps with RedTB  Standing hip abduction in boot (standing on R) 2x12 reps with RedTB     manual therapy techniques: Joint mobilizations were applied to the: L ankle for 8 minutes, including:    Gentle talocrural AP glides grade III-IV   Gentle subtalar figure 8 mobilizations     neuromuscular re-education activities to improve: Balance, Coordination and Proprioception for 9 minutes. The following activities were included:    Half kneeling talar doming x3mins   Half kneeling toe yoga x3mins   Half kneeling towel scrunches x3mins  Half kneeling L foot leading balance eyes closed 2x1min    Not Today:  Half kneeling L foot leading balance 2x1min     Patient Education and Home Exercises     Home Exercises Provided and Patient Education Provided     Education provided:   - Continue HEP     Written Home Exercises Provided: Patient instructed to cont prior HEP. Exercises were reviewed and Gwen was able to demonstrate them prior to the end of the session.  Gwen demonstrated good  understanding of the education provided. See EMR under Patient Instructions for exercises provided during therapy sessions    ASSESSMENT     Gwen tolerated treatment well with initiation of weight bearing activities outside of her boot. We performed foot intrinsic strengthening in half kneeling and performed the shuttle without her boot to progress weight bearing. Will continue to progress as able with focus on normalizing gait and foot/ankle strengthening.     Gwen Is progressing  well towards her goals.   Pt prognosis is Excellent.     Pt will continue to benefit from skilled outpatient physical therapy to address the deficits listed in the problem list box on initial evaluation, provide pt/family education and to maximize pt's level of independence in the home and community environment.     Pt's spiritual, cultural and educational needs considered and pt agreeable to plan of care and goals.     Anticipated barriers to physical therapy: none at this time     Goals:   Short Term Goals: 4-6 weeks (progressing, not met)   1. Patient will be independent in initial HEP to help supplement PT.   2. Patient will improve left ankle dorsiflexion range of motion to 5 degrees of dorsiflexion to help with gait mechanics.   3. Patient will be able to transition out of boot to shoe and demonstrate improved gait mechanics reporting </= 2/10 pain to help with ADLs.   4. Patient will report no pain at rest to demonstrate improvement in condition and quality of life.    Long Term Goals: 8-10 weeks  (progressing, not met)   1. Patient will be independent in updated HEP to help supplement PT and modulate symptoms.   2. Patient will improve FOTO limitation score to </= 31% limited to show improvement in condition.   3. Patient will be able to stand for an hour with no pain to help with return to work.  4. Patient will improve left ankle range of motion to within 5 degrees of right to help with return to job duties and improved gait mechanics.   5. Patient will improve ankle strength to >/= to 4/5 to help with return to work and activities of daily living.     PLAN     Plan of Care Certification: 1/19/2022 to 3/30/2022.    Continue plan of care with focus on progressing weight bearing per MD orders, normalizing gait, improving L ankle mobility, and lower extremity strengthening.     Ovidio Arnold, PT

## 2022-02-02 ENCOUNTER — CLINICAL SUPPORT (OUTPATIENT)
Dept: REHABILITATION | Facility: HOSPITAL | Age: 47
End: 2022-02-02
Attending: ORTHOPAEDIC SURGERY
Payer: COMMERCIAL

## 2022-02-02 DIAGNOSIS — R26.9 GAIT ABNORMALITY: ICD-10-CM

## 2022-02-02 DIAGNOSIS — R29.898 DECREASED STRENGTH OF LOWER EXTREMITY: ICD-10-CM

## 2022-02-02 DIAGNOSIS — M25.672 DECREASED RANGE OF MOTION OF LEFT ANKLE: ICD-10-CM

## 2022-02-02 PROCEDURE — 97110 THERAPEUTIC EXERCISES: CPT | Mod: PN

## 2022-02-02 PROCEDURE — 97112 NEUROMUSCULAR REEDUCATION: CPT | Mod: PN

## 2022-02-02 PROCEDURE — 97140 MANUAL THERAPY 1/> REGIONS: CPT | Mod: PN

## 2022-02-02 PROCEDURE — 97116 GAIT TRAINING THERAPY: CPT | Mod: PN

## 2022-02-02 NOTE — PROGRESS NOTES
[facility]   Physical Therapy Treatment Note     Name: Genet Martinez  Clinic Number: [id number]    Therapy Diagnosis:   Encounter Diagnoses   Name Primary?   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   Evaluation Date: 1/19/2022   Authorization Period Expiration: 12/31/2022  Plan of Care Expiration: 3/30/2022  Progress Note Due: 2/21/2022  Visit # / Visits authorized: 4/20   Total Visits: 5  FOTO: 1/3      Date of Surgery: 12/08/2021  Return to MD date: 2/15/2022     Precautions: Standard and Weightbearing as tolerated in boot (x2 weeks and then progress out of boot to tennis shoe)  - Note from referral: No dry needling, ok to transition to shoe in 2 weeks      Procedures: Excision of Left prominent/accessory navicular, modified Kidner procedure with repair and advancement of the posterior tibial tendon     PTA Visit #: 0/5     FOTO first follow up:   FOTO second follow up:     Time In: 12:10 pm   Time Out: 12:56 pm  Total Appointment Time (timed & untimed codes): 46 minutes    SUBJECTIVE     Pt reports: Doing well, foot is feeling good, she still has a little swelling in the anterior/lateral portion of her ankle but other than that doing ok. She is ready to start practicing without her boot.   She was compliant with home exercise program.   Response to previous treatment: Sore  Functional change:  Improved range of motion    Pain: 1/10  Location: soreness on top of her foot     OBJECTIVE     Observation 2/2/2022:   Left Ankle Range of Motion:  Dorsiflexion  3 degrees    Plantarflexion  40 degrees    Inversion  16 degrees    Eversion  13 degrees        Treatment   Patient is status post Posterior  Tibialis repair and accessory navicular excision.     Patient is 8 weeks 5 days post-op as of 2/2/2022.     Gwen received the treatments listed below:      therapeutic exercises to develop strength and endurance for 19 minutes including:    Assessment as above   Pt education on transitioning from the boot slowly starting with 25% of the day in the shoe for a couple days and then slowly progressing to increased time in the shoe  Half kneeling Dorsiflexion mobilizations 1x20 reps with 3 sec holds   Seated heel raises 3x12   Seated wobble board PF/DF 3x10 reps through comfortable range   Bilateral leg press on shuttle with 50# 3x12 without boot  Single leg press on shuttle with 25# 3x12 without boot   Standing hip extension (standing on R) 3x10 reps with RedTB  Standing hip abduction (standing on R) 3x10 reps with RedTB     Not Today:  Clamshells with RTB 3x12 each     manual therapy techniques: Joint mobilizations were applied to the: L ankle for 8 minutes, including:    Gentle talocrural AP glides grade III-IV   Gentle subtalar figure 8 mobilizations     neuromuscular re-education activities to improve: Balance, Coordination and Proprioception for 9 minutes. The following activities were included:    Half kneeling talar doming x3mins   Half kneeling toe yoga x3mins   Half kneeling towel scrunches 3x10 reps  Tandem balance in shoe 2x30 sec     Not Today:  Half kneeling L foot leading balance 2x1min   Half kneeling L foot leading balance eyes closed 2x1min    Gwen participated in gait training to improve functional mobility and safety for 10 minutes, including:    Forward weight shifts in shoe 1x15 reps   Forward weight shifts over helio 1x15 reps   Gait training in shoe:  - 2x15 feet stepping over hurdles with left lower extremity  - 2x15 feet stepping over hurdles with right lower extremity  - ambulating with reciprocal gait in shoe 2x50 feet     Patient Education and Home Exercises     Home Exercises Provided and  Patient Education Provided     Education provided:   - Continue HEP     Written Home Exercises Provided: Patient instructed to cont prior HEP. Exercises were reviewed and Gwen was able to demonstrate them prior to the end of the session.  Gwen demonstrated good  understanding of the education provided. See EMR under Patient Instructions for exercises provided during therapy sessions    ASSESSMENT     Gwen tolerated treatment session well she was able to be progressed today with gait training in her shoe. She tolerated gait training fairly well with no loss of balance. She displays decreased push-off on left lower extremity and decreased step length on right lower extremity but able to perform gait training without pain. She tolerated all therex well with no issues. Will continue to progress as able with emphasis on gait mechanics, foot/ankle range of motion, lower extremity strength, and balance/proprioception training.     Gwen Is progressing well towards her goals.   Pt prognosis is Excellent.     Pt will continue to benefit from skilled outpatient physical therapy to address the deficits listed in the problem list box on initial evaluation, provide pt/family education and to maximize pt's level of independence in the home and community environment.     Pt's spiritual, cultural and educational needs considered and pt agreeable to plan of care and goals.     Anticipated barriers to physical therapy: none at this time     Goals:   Short Term Goals: 4-6 weeks (progressing, not met)   1. Patient will be independent in initial HEP to help supplement PT. - MET  2. Patient will improve left ankle dorsiflexion range of motion to 5 degrees of dorsiflexion to help with gait mechanics.   3. Patient will be able to transition out of boot to shoe and demonstrate improved gait mechanics reporting </= 2/10 pain to help with ADLs.   4. Patient will report no pain at rest to demonstrate improvement in condition and quality of  life.    Long Term Goals: 8-10 weeks  (progressing, not met)   1. Patient will be independent in updated HEP to help supplement PT and modulate symptoms.   2. Patient will improve FOTO limitation score to </= 31% limited to show improvement in condition.   3. Patient will be able to stand for an hour with no pain to help with return to work.  4. Patient will improve left ankle range of motion to within 5 degrees of right to help with return to job duties and improved gait mechanics.   5. Patient will improve ankle strength to >/= to 4/5 to help with return to work and activities of daily living.     PLAN     Plan of Care Certification: 1/19/2022 to 3/30/2022.    Continue plan of care with focus on gait mechanics, ankle mobility, balance/proprioception, and lower extremity strengthening.      Grecia Tellez, PT

## 2022-02-07 ENCOUNTER — CLINICAL SUPPORT (OUTPATIENT)
Dept: REHABILITATION | Facility: HOSPITAL | Age: 47
End: 2022-02-07
Attending: ORTHOPAEDIC SURGERY
Payer: COMMERCIAL

## 2022-02-07 DIAGNOSIS — R29.898 DECREASED STRENGTH OF LOWER EXTREMITY: ICD-10-CM

## 2022-02-07 DIAGNOSIS — M25.672 DECREASED RANGE OF MOTION OF LEFT ANKLE: ICD-10-CM

## 2022-02-07 DIAGNOSIS — R26.9 GAIT ABNORMALITY: ICD-10-CM

## 2022-02-07 PROCEDURE — 97140 MANUAL THERAPY 1/> REGIONS: CPT | Mod: PN

## 2022-02-07 PROCEDURE — 97112 NEUROMUSCULAR REEDUCATION: CPT | Mod: PN

## 2022-02-07 PROCEDURE — 97110 THERAPEUTIC EXERCISES: CPT | Mod: PN

## 2022-02-07 NOTE — PROGRESS NOTES
[facility]   Physical Therapy Treatment Note     Name: Genet Martinez  Clinic Number: [id number]    Therapy Diagnosis:   Encounter Diagnoses   Name Primary?   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   Evaluation Date: 1/19/2022   Authorization Period Expiration: 12/31/2022  Plan of Care Expiration: 3/30/2022  Progress Note Due: 2/21/2022  Visit # / Visits authorized: 5/20   Total Visits: 6  FOTO: 1/3      Date of Surgery: 12/08/2021  Return to MD date: 2/15/2022     Precautions: Standard and Weightbearing as tolerated in boot (x2 weeks and then progress out of boot to tennis shoe)  - Note from referral: No dry needling, ok to transition to shoe in 2 weeks      Procedures: Excision of Left prominent/accessory navicular, modified Kidner procedure with repair and advancement of the posterior tibial tendon     PTA Visit #: 0/5     FOTO first follow up:   FOTO second follow up:     Time In: 1218   Time Out: 1300  Total Appointment Time (timed & untimed codes): 42 minutes    SUBJECTIVE     Pt reports: has been doing well in her shoe. Some soreness in medial ankle   She was compliant with home exercise program.   Response to previous treatment: Sore  Functional change:  Improved range of motion    Pain: 1/10  Location: soreness on top of her foot     OBJECTIVE     Observation 2/2/2022:   Left Ankle Range of Motion:  Dorsiflexion  3 degrees    Plantarflexion  40 degrees    Inversion  16 degrees    Eversion  13 degrees        Treatment   Patient is status post Posterior Tibialis repair and accessory navicular excision.     Patient is 9 weeks 3 days post-op as of 2/7/2022.     Gwen received the treatments  "listed below:      therapeutic exercises to develop strength and endurance for 25 minutes including:    Assessment as above   Pt education on transitioning from the boot slowly starting with 25% of the day in the shoe for a couple days and then slowly progressing to increased time in the shoe  Half kneeling Dorsiflexion mobilizations 1x20 reps with 3 sec holds   Shuttle Bilateral heel raises in shoe 3x12 25#  Bilateral leg press on shuttle with 75# 3x12 in shoe  Single leg press on shuttle with 50# 3x12 in shoe   Lateral walks with RTB [id number] there and back   Step ups on 4" step leading with LLE 2x10       Not Today:  Clamshells with RTB 3x12 each   Standing hip extension (standing on R) 3x10 reps with RedTB  Standing hip abduction (standing on R) 3x10 reps with RedTB     manual therapy techniques: Joint mobilizations were applied to the: L ankle for 8 minutes, including:    Gentle talocrural AP glides grade III-IV   Gentle subtalar figure 8 mobilizations     neuromuscular re-education activities to improve: Balance, Coordination and Proprioception for 9 minutes. The following activities were included:    Half kneeling talar doming x3mins   Half kneeling toe yoga x3mins   Single limb balance on L 4x30s in shoe       Not Today:  Half kneeling towel scrunches 3x10 reps  Tandem balance in shoe 2x30 sec   Half kneeling L foot leading balance 2x1min   Half kneeling L foot leading balance eyes closed 2x1min    Gwen participated in gait training to improve functional mobility and safety for 0 minutes, including:    Forward weight shifts in shoe 1x15 reps   Forward weight shifts over helio 1x15 reps   Gait training in shoe:  - 2x15 feet stepping over hurdles with left lower extremity  - 2x15 feet stepping over hurdles with right lower extremity  - ambulating with reciprocal gait in shoe 2x50 feet     Patient Education and Home Exercises     Home Exercises Provided and Patient Education Provided     Education provided:   - " Continue HEP     Written Home Exercises Provided: Patient instructed to cont prior HEP. Exercises were reviewed and Gwen was able to demonstrate them prior to the end of the session.  Gwen demonstrated good  understanding of the education provided. See EMR under Patient Instructions for exercises provided during therapy sessions    ASSESSMENT     Gwen tolerated treatment well with increased focus on weight bearing in her tennis shoe. She continues to ambulate with slight decrease time in stance on LLE. We were able to increase resistance on the shuttle today as well. Will continue to progress as able.     Gwen Is progressing well towards her goals.   Pt prognosis is Excellent.     Pt will continue to benefit from skilled outpatient physical therapy to address the deficits listed in the problem list box on initial evaluation, provide pt/family education and to maximize pt's level of independence in the home and community environment.     Pt's spiritual, cultural and educational needs considered and pt agreeable to plan of care and goals.     Anticipated barriers to physical therapy: none at this time     Goals:   Short Term Goals: 4-6 weeks (progressing, not met)   1. Patient will be independent in initial HEP to help supplement PT. - MET  2. Patient will improve left ankle dorsiflexion range of motion to 5 degrees of dorsiflexion to help with gait mechanics.   3. Patient will be able to transition out of boot to shoe and demonstrate improved gait mechanics reporting </= 2/10 pain to help with ADLs.   4. Patient will report no pain at rest to demonstrate improvement in condition and quality of life.    Long Term Goals: 8-10 weeks  (progressing, not met)   1. Patient will be independent in updated HEP to help supplement PT and modulate symptoms.   2. Patient will improve FOTO limitation score to </= 31% limited to show improvement in condition.   3. Patient will be able to stand for an hour with no pain to help with  return to work.  4. Patient will improve left ankle range of motion to within 5 degrees of right to help with return to job duties and improved gait mechanics.   5. Patient will improve ankle strength to >/= to 4/5 to help with return to work and activities of daily living.     PLAN     Plan of Care Certification: 1/19/2022 to 3/30/2022.    Continue plan of care with focus on gait mechanics, ankle mobility, balance/proprioception, and lower extremity strengthening.      Ovidio Arnold, PT

## 2022-02-09 ENCOUNTER — CLINICAL SUPPORT (OUTPATIENT)
Dept: REHABILITATION | Facility: HOSPITAL | Age: 47
End: 2022-02-09
Attending: ORTHOPAEDIC SURGERY
Payer: COMMERCIAL

## 2022-02-09 DIAGNOSIS — R26.9 GAIT ABNORMALITY: ICD-10-CM

## 2022-02-09 DIAGNOSIS — M25.672 DECREASED RANGE OF MOTION OF LEFT ANKLE: ICD-10-CM

## 2022-02-09 DIAGNOSIS — R29.898 DECREASED STRENGTH OF LOWER EXTREMITY: ICD-10-CM

## 2022-02-09 PROCEDURE — 97112 NEUROMUSCULAR REEDUCATION: CPT | Mod: PN

## 2022-02-09 PROCEDURE — 97110 THERAPEUTIC EXERCISES: CPT | Mod: PN

## 2022-02-09 PROCEDURE — 97140 MANUAL THERAPY 1/> REGIONS: CPT | Mod: PN

## 2022-02-09 NOTE — PROGRESS NOTES
[facility]   Physical Therapy Treatment Note     Name: Genet Martinez  Clinic Number: [id number]    Therapy Diagnosis:   Encounter Diagnoses   Name Primary?   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   Evaluation Date: 1/19/2022   Authorization Period Expiration: 12/31/2022  Plan of Care Expiration: 3/30/2022  Progress Note Due: 2/21/2022  Visit # / Visits authorized: 6/20   Total Visits: 7  FOTO: 1/3      Date of Surgery: 12/08/2021  Return to MD date: 2/15/2022     Precautions: Standard and WBAT in shoe   - Note from referral: No dry needling, ok to transition to shoe in 2 weeks      Procedures: Excision of Left prominent/accessory navicular, modified Kidner procedure with repair and advancement of the posterior tibial tendon     PTA Visit #: 0/5     FOTO first follow up:   FOTO second follow up:     Time In: 1216  Time Out: 1300  Total Appointment Time (timed & untimed codes): 44 minutes    SUBJECTIVE     Pt reports:doing really well, she feels once her swelling goes down in her foot she may be good to go.     She was compliant with home exercise program.   Response to previous treatment: Sore  Functional change:  Improved range of motion    Pain: 1/10  Location: soreness on top of her foot     OBJECTIVE     Observation 2/2/2022:   Left Ankle Range of Motion:  Dorsiflexion  3 degrees    Plantarflexion  40 degrees    Inversion  16 degrees    Eversion  13 degrees        Treatment   Patient is status post Posterior Tibialis repair and accessory navicular excision.     Patient is 9 weeks 5 days post-op as of 2/9/2022.     Gwen received the treatments listed below:      therapeutic exercises to develop  "strength and endurance for 25 minutes including:    Assessment as above     Half kneeling Dorsiflexion mobilizations x3mins with 9#db on knee with 3 sec holds   Lateral walks with RTB [id number] there and back   Shuttle Bilateral heel raises in shoe 3x12 25#  Bilateral leg press on shuttle with 75# 3x12 in shoe  Single leg press on shuttle with 50# 3x12 in shoe   Step ups on 6" step leading with LLE 3x10       Not Today:  Clamshells with RTB 3x12 each   Standing hip extension (standing on R) 3x10 reps with RedTB  Standing hip abduction (standing on R) 3x10 reps with RedTB     manual therapy techniques: Joint mobilizations were applied to the: L ankle for 8 minutes, including:    Gentle talocrural AP glides grade III-IV   Gentle subtalar figure 8 mobilizations     neuromuscular re-education activities to improve: Balance, Coordination and Proprioception for 11 minutes. The following activities were included:    Standing talar [id number]   Single limb balance on L 4x30s no shoe   Tandem stance 3x30s leading with each leg no shoe      Not Today:  Half kneeling toe yoga x3mins   Half kneeling towel scrunches 3x10 reps  Tandem balance in shoe 2x30 sec   Half kneeling L foot leading balance 2x1min   Half kneeling L foot leading balance eyes closed 2x1min    Gwen participated in gait training to improve functional mobility and safety for 0 minutes, including:    Forward weight shifts in shoe 1x15 reps   Forward weight shifts over helio 1x15 reps   Gait training in shoe:  - 2x15 feet stepping over hurdles with left lower extremity  - 2x15 feet stepping over hurdles with right lower extremity  - ambulating with reciprocal gait in shoe 2x50 feet     Patient Education and Home Exercises     Home Exercises Provided and Patient Education Provided     Education provided:   - Continue HEP     Written Home Exercises Provided: Patient instructed to cont prior HEP. Exercises were reviewed and Gwen was able to demonstrate them prior " to the end of the session.  Gwen demonstrated good  understanding of the education provided. See EMR under Patient Instructions for exercises provided during therapy sessions    ASSESSMENT     Gwen continues to tolerate treatment well. She was able to perform single limb balance without a shoe today. Pt continues with decreased time in stance on LLE most likely due to decreased dorsiflexion range of motion on that side. Will continue to progress as able.      Gwen Is progressing well towards her goals.   Pt prognosis is Excellent.     Pt will continue to benefit from skilled outpatient physical therapy to address the deficits listed in the problem list box on initial evaluation, provide pt/family education and to maximize pt's level of independence in the home and community environment.     Pt's spiritual, cultural and educational needs considered and pt agreeable to plan of care and goals.     Anticipated barriers to physical therapy: none at this time     Goals:   Short Term Goals: 4-6 weeks (progressing, not met)   1. Patient will be independent in initial HEP to help supplement PT. - MET  2. Patient will improve left ankle dorsiflexion range of motion to 5 degrees of dorsiflexion to help with gait mechanics.   3. Patient will be able to transition out of boot to shoe and demonstrate improved gait mechanics reporting </= 2/10 pain to help with ADLs.   4. Patient will report no pain at rest to demonstrate improvement in condition and quality of life.    Long Term Goals: 8-10 weeks  (progressing, not met)   1. Patient will be independent in updated HEP to help supplement PT and modulate symptoms.   2. Patient will improve FOTO limitation score to </= 31% limited to show improvement in condition.   3. Patient will be able to stand for an hour with no pain to help with return to work.  4. Patient will improve left ankle range of motion to within 5 degrees of right to help with return to job duties and improved gait  mechanics.   5. Patient will improve ankle strength to >/= to 4/5 to help with return to work and activities of daily living.     PLAN     Plan of Care Certification: 1/19/2022 to 3/30/2022.    Continue plan of care with focus on gait mechanics, ankle mobility, balance/proprioception, and lower extremity strengthening.      Ovidio Arnold, PT

## 2022-02-14 ENCOUNTER — CLINICAL SUPPORT (OUTPATIENT)
Dept: REHABILITATION | Facility: HOSPITAL | Age: 47
End: 2022-02-14
Attending: HOSPITALIST
Payer: COMMERCIAL

## 2022-02-14 DIAGNOSIS — M25.672 DECREASED RANGE OF MOTION OF LEFT ANKLE: ICD-10-CM

## 2022-02-14 DIAGNOSIS — R29.898 DECREASED STRENGTH OF LOWER EXTREMITY: ICD-10-CM

## 2022-02-14 DIAGNOSIS — R26.9 GAIT ABNORMALITY: ICD-10-CM

## 2022-02-14 PROCEDURE — 97110 THERAPEUTIC EXERCISES: CPT | Mod: PN

## 2022-02-14 PROCEDURE — 97140 MANUAL THERAPY 1/> REGIONS: CPT | Mod: PN

## 2022-02-14 NOTE — PROGRESS NOTES
[facility]   Physical Therapy Treatment Note     Name: Genet Martinez  Clinic Number: [id number]    Therapy Diagnosis:   Encounter Diagnoses   Name Primary?   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   Evaluation Date: 1/19/2022   Authorization Period Expiration: 12/31/2022  Plan of Care Expiration: 3/30/2022  Progress Note Due: 2/21/2022  Visit # / Visits authorized: 6/20   Total Visits: 7  FOTO: 1/3      Date of Surgery: 12/08/2021  Return to MD date: 2/15/2022     Precautions: Standard and WBAT in shoe   - Note from referral: No dry needling, ok to transition to shoe in 2 weeks      Procedures: Excision of Left prominent/accessory navicular, modified Kidner procedure with repair and advancement of the posterior tibial tendon     PTA Visit #: 0/5     FOTO first follow up: 02/09/2022  FOTO second follow up:     Time In: 1216  Time Out: 1300  Total Appointment Time (timed & untimed codes): 42 minutes    SUBJECTIVE     Pt reports: She went to the parades this weekend and had to walk quickly for 2 blocks, that caused some medial ankle pain, but it is better now.     She was compliant with home exercise program.   Response to previous treatment: Sore  Functional change:  Improved range of motion    Pain: 1/10  Location: soreness on top of her foot     OBJECTIVE     Observation 2/14/2022:   Left Ankle A/P Range of Motion:  Dorsiflexion  5/10 degrees    Plantarflexion  45 degrees    Inversion  20 degrees    Eversion  20 degrees      Lower Extremity Strength (MMT):    Right  Left    Hip Flexion 4+/5 4/5   Hip Abduction 4/5 4/5   Knee Flexion 5/5 5/5   Knee Extension 5/5 5/5   Ankle Dorsiflexion 5/5 5/5  "  Ankle Plantarflexion 5/5 3-/5   Ankle Inversion 5/5 3-/5    Ankle Eversion  5/5 3-/5    Foot Intrinsics (DIP) 4+/5 3+/5     CMS Impairment/Limitation/Restriction for FOTO Ankle Survey  Status Limitation G-Code CMS Severity Modifier  Intake 52% 48%  Predicted 69% 31% Goal Status+ CJ - At least 20 percent but less than 40 percent  2/9/2022 69% 31%  2/14/2022 67% 33% Current Status CJ - At least 20 percent but less than 40 percent    Treatment   Patient is status post Posterior Tibialis repair and accessory navicular excision.     Patient is 10 weeks 3 days post-op as of 2/9/2022.     Gwen received the treatments listed below:      therapeutic exercises to develop strength and endurance for 28 minutes including:    Assessment as above   Half kneeling Dorsiflexion mobilizations x3mins with 9#db on knee with 3 sec holds   Shuttle single leg heel raises in shoe 3x12 25#  Bilateral leg press on shuttle with 100# 3x12 in shoe  Single leg press on shuttle with 50# 3x12 in shoe   Lateral walks with RTB 3x10 yds there and back     Not Today:  Step ups on 6" step leading with LLE 3x10   Clamshells with RTB 3x12 each   Standing hip extension (standing on R) 3x10 reps with RedTB  Standing hip abduction (standing on R) 3x10 reps with RedTB     manual therapy techniques: Joint mobilizations were applied to the: L ankle for 8 minutes, including:    Gentle talocrural AP glides grade III-IV   Gentle subtalar figure 8 mobilizations     neuromuscular re-education activities to improve: Balance, Coordination and Proprioception for 6 minutes. The following activities were included:    Standing talar [id number]   Standing toe yoga x3min    Not today:   Single limb balance on L 4x30s no shoe   Tandem stance 3x30s leading with each leg no shoe        Gwen participated in gait training to improve functional mobility and safety for 0 minutes, including:    Forward weight shifts in shoe 1x15 reps   Forward weight shifts over helio 1x15 " reps   Gait training in shoe:  - 2x15 feet stepping over hurdles with left lower extremity  - 2x15 feet stepping over hurdles with right lower extremity  - ambulating with reciprocal gait in shoe 2x50 feet     Patient Education and Home Exercises     Home Exercises Provided and Patient Education Provided     Education provided:   - Continue HEP     Written Home Exercises Provided: Patient instructed to cont prior HEP. Exercises were reviewed and Gwen was able to demonstrate them prior to the end of the session.  Gwen demonstrated good  understanding of the education provided. See EMR under Patient Instructions for exercises provided during therapy sessions    ASSESSMENT     Gwen has been seen for 7 visits over the past 4 weeks and demonstrates significant improvements in L ankle range of motion and weight bearing tolerance. She continues with limited L ankle mobility and L ankle weakness. Her symptoms and deficits are limiting her ability to perform community ambulation and heel raises at this time. Will progress as able.     Gwen Is progressing well towards her goals.   Pt prognosis is Excellent.     Pt will continue to benefit from skilled outpatient physical therapy to address the deficits listed in the problem list box on initial evaluation, provide pt/family education and to maximize pt's level of independence in the home and community environment.     Pt's spiritual, cultural and educational needs considered and pt agreeable to plan of care and goals.     Anticipated barriers to physical therapy: none at this time     Goals:   Short Term Goals: 4-6 weeks (progressing, not met)   1. Patient will be independent in initial HEP to help supplement PT. - MET  2. Patient will improve left ankle dorsiflexion range of motion to 5 degrees of dorsiflexion to help with gait mechanics. MET  3. Patient will be able to transition out of boot to shoe and demonstrate improved gait mechanics reporting </= 2/10 pain to help with  ADLs. MET  4. Patient will report no pain at rest to demonstrate improvement in condition and quality of life. MET    Long Term Goals: 8-10 weeks  (progressing, not met)   1. Patient will be independent in updated HEP to help supplement PT and modulate symptoms.   2. Patient will improve FOTO limitation score to </= 31% limited to show improvement in condition.   3. Patient will be able to stand for an hour with no pain to help with return to work.  4. Patient will improve left ankle range of motion to within 5 degrees of right to help with return to job duties and improved gait mechanics.   5. Patient will improve ankle strength to >/= to 4/5 to help with return to work and activities of daily living.     PLAN     Plan of Care Certification: 1/19/2022 to 3/30/2022.    Continue plan of care with focus on gait mechanics, ankle mobility, balance/proprioception, and lower extremity strengthening.      Ovidio Anrold, PT

## 2022-02-15 ENCOUNTER — OFFICE VISIT (OUTPATIENT)
Dept: ORTHOPEDICS | Facility: CLINIC | Age: 47
End: 2022-02-15
Payer: COMMERCIAL

## 2022-02-15 VITALS
HEART RATE: 75 BPM | DIASTOLIC BLOOD PRESSURE: 78 MMHG | WEIGHT: 173.5 LBS | BODY MASS INDEX: 30.74 KG/M2 | HEIGHT: 63 IN | SYSTOLIC BLOOD PRESSURE: 142 MMHG

## 2022-02-15 DIAGNOSIS — M79.672 PAIN ASSOCIATED WITH ACCESSORY NAVICULAR BONE OF LEFT FOOT: Primary | ICD-10-CM

## 2022-02-15 DIAGNOSIS — Q74.2 PAIN ASSOCIATED WITH ACCESSORY NAVICULAR BONE OF LEFT FOOT: Primary | ICD-10-CM

## 2022-02-15 PROCEDURE — 3077F PR MOST RECENT SYSTOLIC BLOOD PRESSURE >= 140 MM HG: ICD-10-PCS | Mod: CPTII,S$GLB,, | Performed by: ORTHOPAEDIC SURGERY

## 2022-02-15 PROCEDURE — 99024 PR POST-OP FOLLOW-UP VISIT: ICD-10-PCS | Mod: S$GLB,,, | Performed by: ORTHOPAEDIC SURGERY

## 2022-02-15 PROCEDURE — 99999 PR PBB SHADOW E&M-EST. PATIENT-LVL III: ICD-10-PCS | Mod: PBBFAC,,, | Performed by: ORTHOPAEDIC SURGERY

## 2022-02-15 PROCEDURE — 3078F PR MOST RECENT DIASTOLIC BLOOD PRESSURE < 80 MM HG: ICD-10-PCS | Mod: CPTII,S$GLB,, | Performed by: ORTHOPAEDIC SURGERY

## 2022-02-15 PROCEDURE — 3008F PR BODY MASS INDEX (BMI) DOCUMENTED: ICD-10-PCS | Mod: CPTII,S$GLB,, | Performed by: ORTHOPAEDIC SURGERY

## 2022-02-15 PROCEDURE — 1160F PR REVIEW ALL MEDS BY PRESCRIBER/CLIN PHARMACIST DOCUMENTED: ICD-10-PCS | Mod: CPTII,S$GLB,, | Performed by: ORTHOPAEDIC SURGERY

## 2022-02-15 PROCEDURE — 99999 PR PBB SHADOW E&M-EST. PATIENT-LVL III: CPT | Mod: PBBFAC,,, | Performed by: ORTHOPAEDIC SURGERY

## 2022-02-15 PROCEDURE — 3078F DIAST BP <80 MM HG: CPT | Mod: CPTII,S$GLB,, | Performed by: ORTHOPAEDIC SURGERY

## 2022-02-15 PROCEDURE — 3077F SYST BP >= 140 MM HG: CPT | Mod: CPTII,S$GLB,, | Performed by: ORTHOPAEDIC SURGERY

## 2022-02-15 PROCEDURE — 1160F RVW MEDS BY RX/DR IN RCRD: CPT | Mod: CPTII,S$GLB,, | Performed by: ORTHOPAEDIC SURGERY

## 2022-02-15 PROCEDURE — 3008F BODY MASS INDEX DOCD: CPT | Mod: CPTII,S$GLB,, | Performed by: ORTHOPAEDIC SURGERY

## 2022-02-15 PROCEDURE — 99024 POSTOP FOLLOW-UP VISIT: CPT | Mod: S$GLB,,, | Performed by: ORTHOPAEDIC SURGERY

## 2022-02-15 PROCEDURE — 1159F PR MEDICATION LIST DOCUMENTED IN MEDICAL RECORD: ICD-10-PCS | Mod: CPTII,S$GLB,, | Performed by: ORTHOPAEDIC SURGERY

## 2022-02-15 PROCEDURE — 1159F MED LIST DOCD IN RCRD: CPT | Mod: CPTII,S$GLB,, | Performed by: ORTHOPAEDIC SURGERY

## 2022-02-15 NOTE — PROGRESS NOTES
Subjective:      Patient ID: Genet Martinez is a 46 y.o. female.    Chief Complaint: Post-op Evaluation of the Left Foot (DOS 12/08/2021 Excision accessory navicular, Mod [place], repair and advance PT)    Pt is doing very well today. She rates her pain as 3/10 today. Pain is mainly after long periods of walking. She is doing well with PT.     Social History     Occupational History    Not on file   Tobacco Use    Smoking status: Never Smoker    Smokeless tobacco: Never Used   Substance and Sexual Activity    Alcohol use: No    Drug use: No    Sexual activity: Not on file          Objective:    Ortho Exam     LLE:  Neurovascularly intact, incision well healed, minimal dependent edema. Minimal tenderness to palpation. NROM of the foot and ankle. 5/5 strength. Walks with a non-antalgic gait.     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 Gradual return to activities. F/u prn.

## 2022-02-16 ENCOUNTER — CLINICAL SUPPORT (OUTPATIENT)
Dept: REHABILITATION | Facility: HOSPITAL | Age: 47
End: 2022-02-16
Attending: ORTHOPAEDIC SURGERY
Payer: COMMERCIAL

## 2022-02-16 DIAGNOSIS — R26.9 GAIT ABNORMALITY: Primary | ICD-10-CM

## 2022-02-16 DIAGNOSIS — M25.672 DECREASED RANGE OF MOTION OF LEFT ANKLE: ICD-10-CM

## 2022-02-16 DIAGNOSIS — R29.898 DECREASED STRENGTH OF LOWER EXTREMITY: ICD-10-CM

## 2022-02-16 PROCEDURE — 97112 NEUROMUSCULAR REEDUCATION: CPT | Mod: PN

## 2022-02-16 PROCEDURE — 97140 MANUAL THERAPY 1/> REGIONS: CPT | Mod: PN

## 2022-02-16 PROCEDURE — 97110 THERAPEUTIC EXERCISES: CPT | Mod: PN

## 2022-02-20 NOTE — PROGRESS NOTES
[facility]   Physical Therapy Treatment Note     Name: Genet Martinez  Clinic Number: [id number]    Therapy Diagnosis:   Encounter Diagnoses   Name Primary?   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   Evaluation Date: 1/19/2022   Authorization Period Expiration: 12/31/2022  Plan of Care Expiration: 3/30/2022  Progress Note Due: 2/21/2022  Visit # / Visits authorized: 8/20   Total Visits: 9  FOTO: 2/3      Date of Surgery: 12/08/2021  Return to MD date: 2/15/2022     Precautions: Standard and WBAT in shoe   - Note from referral: No dry needling, ok to transition to shoe in 2 weeks      Procedures: Excision of Left prominent/accessory navicular, modified Kidner procedure with repair and advancement of the posterior tibial tendon     PTA Visit #: 0/5     FOTO first follow up: 02/09/2022  FOTO second follow up:     Time In: 12:14 pm  Time Out: 12:54 pm   Total Appointment Time (timed & untimed codes): 40 minutes    SUBJECTIVE     Pt reports: She went to the parades this weekend and had to walk quickly for 2 blocks, that caused some medial ankle pain, but it is better now.     She was compliant with home exercise program.   Response to previous treatment: Sore  Functional change:  Improved range of motion    Pain: 1/10  Location: soreness on top of her foot     OBJECTIVE     Observation 2/16/2022:     Left Ankle A/P Range of Motion:  Dorsiflexion  5/10 degrees    Plantarflexion  45 degrees    Inversion  20 degrees    Eversion  20 degrees        Treatment   Patient is status post Posterior Tibialis repair and accessory navicular excision.     Patient is 11 weeks 3 days post-op as of 2/16/2022.     Gwen  "received the treatments listed below:      therapeutic exercises to develop strength and endurance for 20 minutes including:    Assessment as above   Half kneeling Dorsiflexion mobilizations x3mins with 10#db on knee with 3 sec holds   Shuttle Double Leg with 100# 3x12 reps   Shuttle Single Leg with 62.5# 3x10 reps   Shuttle Single leg heel raises 3x12 reps with 25#  Lateral walks with RTB 2x10 yds there and back   Lateral Step downs on small riser 3x10 reps   - with emphasis on maintaining a level pelvis and not collapsing into valgus   Double leg heel raises with tennis ball in between heels 3x12 reps     Not Today:  Step ups on 6" step leading with LLE 3x10   Clamshells with RTB 3x12 each   Standing hip extension (standing on R) 3x10 reps with RedTB  Standing hip abduction (standing on R) 3x10 reps with RedTB     manual therapy techniques: Joint mobilizations were applied to the: L ankle for 8 minutes, including:    Gentle talocrural AP glides grade III-IV   Gentle subtalar figure 8 mobilizations     neuromuscular re-education activities to improve: Balance, Coordination and Proprioception for 12 minutes. The following activities were included:    Standing talar doming 2x10 reps with 5 sec holds   Single Leg Balance on blue foam 4x15 sec   Cone taps with single leg balance 3 cones 2x5 reps each cone   Step ups with high march and hold at end 2x12 reps on 9-inch step     Not today:   Single limb balance on L 4x30s no shoe   Tandem stance 3x30s leading with each leg no shoe  Standing toe yoga x3min    Gwen participated in gait training to improve functional mobility and safety for 0 minutes, including:    Forward weight shifts in shoe 1x15 reps   Forward weight shifts over helio 1x15 reps   Gait training in shoe:  - 2x15 feet stepping over hurdles with left lower extremity  - 2x15 feet stepping over hurdles with right lower extremity  - ambulating with reciprocal gait in shoe 2x50 feet     Patient Education and " Home Exercises     Home Exercises Provided and Patient Education Provided     Education provided:   - Continue HEP     Written Home Exercises Provided: Patient instructed to cont prior HEP. Exercises were reviewed and Gwen was able to demonstrate them prior to the end of the session.  Gwen demonstrated good  understanding of the education provided. See EMR under Patient Instructions for exercises provided during therapy sessions    ASSESSMENT     Gwen tolerated physical therapy session well with no adverse effects. She was able to be progressed with some balance activities as well as increased hip/glute strengthening. She continues to benefit from foot intrinsic strengthening exercises and verbal cues for proper form. Will continue to monitor and progress as able.     Gwen Is progressing well towards her goals.   Pt prognosis is Excellent.     Pt will continue to benefit from skilled outpatient physical therapy to address the deficits listed in the problem list box on initial evaluation, provide pt/family education and to maximize pt's level of independence in the home and community environment.     Pt's spiritual, cultural and educational needs considered and pt agreeable to plan of care and goals.     Anticipated barriers to physical therapy: none at this time     Goals:   Short Term Goals: 4-6 weeks    1. Patient will be independent in initial HEP to help supplement PT. - MET  2. Patient will improve left ankle dorsiflexion range of motion to 5 degrees of dorsiflexion to help with gait mechanics. MET  3. Patient will be able to transition out of boot to shoe and demonstrate improved gait mechanics reporting </= 2/10 pain to help with ADLs. MET  4. Patient will report no pain at rest to demonstrate improvement in condition and quality of life. MET    Long Term Goals: 8-10 weeks  (progressing, not met)   1. Patient will be independent in updated HEP to help supplement PT and modulate symptoms.   2. Patient will  improve FOTO limitation score to </= 31% limited to show improvement in condition.   3. Patient will be able to stand for an hour with no pain to help with return to work.  4. Patient will improve left ankle range of motion to within 5 degrees of right to help with return to job duties and improved gait mechanics.   5. Patient will improve ankle strength to >/= to 4/5 to help with return to work and activities of daily living.     PLAN     Plan of Care Certification: 1/19/2022 to 3/30/2022.    Continue plan of care with focus on gait mechanics, ankle mobility, balance/proprioception, and lower extremity strengthening.      Grecia Tellez, PT

## 2022-03-08 ENCOUNTER — PATIENT MESSAGE (OUTPATIENT)
Dept: ADMINISTRATIVE | Facility: OTHER | Age: 47
End: 2022-03-08
Payer: COMMERCIAL

## 2022-03-21 ENCOUNTER — PATIENT MESSAGE (OUTPATIENT)
Dept: ADMINISTRATIVE | Facility: HOSPITAL | Age: 47
End: 2022-03-21
Payer: COMMERCIAL

## 2022-04-14 ENCOUNTER — PATIENT MESSAGE (OUTPATIENT)
Dept: ORTHOPEDICS | Facility: CLINIC | Age: 47
End: 2022-04-14
Payer: COMMERCIAL

## 2022-04-18 LAB — PAP RECOMMENDATION EXT: NORMAL

## 2022-05-31 ENCOUNTER — OFFICE VISIT (OUTPATIENT)
Dept: FAMILY MEDICINE | Facility: CLINIC | Age: 47
End: 2022-05-31
Payer: COMMERCIAL

## 2022-05-31 VITALS
DIASTOLIC BLOOD PRESSURE: 78 MMHG | RESPIRATION RATE: 16 BRPM | BODY MASS INDEX: 31.36 KG/M2 | HEIGHT: 63 IN | HEART RATE: 72 BPM | WEIGHT: 177 LBS | OXYGEN SATURATION: 100 % | SYSTOLIC BLOOD PRESSURE: 132 MMHG

## 2022-05-31 DIAGNOSIS — Z11.59 ENCOUNTER FOR HEPATITIS C SCREENING TEST FOR LOW RISK PATIENT: ICD-10-CM

## 2022-05-31 DIAGNOSIS — Z11.4 ENCOUNTER FOR SCREENING FOR HUMAN IMMUNODEFICIENCY VIRUS (HIV): ICD-10-CM

## 2022-05-31 DIAGNOSIS — K21.9 GASTROESOPHAGEAL REFLUX DISEASE, UNSPECIFIED WHETHER ESOPHAGITIS PRESENT: ICD-10-CM

## 2022-05-31 DIAGNOSIS — Z00.00 PHYSICAL EXAM: Primary | ICD-10-CM

## 2022-05-31 DIAGNOSIS — F41.9 ANXIETY: ICD-10-CM

## 2022-05-31 DIAGNOSIS — G43.109 MIGRAINE WITH AURA AND WITHOUT STATUS MIGRAINOSUS, NOT INTRACTABLE: ICD-10-CM

## 2022-05-31 DIAGNOSIS — Z12.11 COLON CANCER SCREENING: ICD-10-CM

## 2022-05-31 PROCEDURE — 1159F PR MEDICATION LIST DOCUMENTED IN MEDICAL RECORD: ICD-10-PCS | Mod: CPTII,S$GLB,, | Performed by: FAMILY MEDICINE

## 2022-05-31 PROCEDURE — 3075F SYST BP GE 130 - 139MM HG: CPT | Mod: CPTII,S$GLB,, | Performed by: FAMILY MEDICINE

## 2022-05-31 PROCEDURE — 1160F RVW MEDS BY RX/DR IN RCRD: CPT | Mod: CPTII,S$GLB,, | Performed by: FAMILY MEDICINE

## 2022-05-31 PROCEDURE — 3008F BODY MASS INDEX DOCD: CPT | Mod: CPTII,S$GLB,, | Performed by: FAMILY MEDICINE

## 2022-05-31 PROCEDURE — 1160F PR REVIEW ALL MEDS BY PRESCRIBER/CLIN PHARMACIST DOCUMENTED: ICD-10-PCS | Mod: CPTII,S$GLB,, | Performed by: FAMILY MEDICINE

## 2022-05-31 PROCEDURE — 3008F PR BODY MASS INDEX (BMI) DOCUMENTED: ICD-10-PCS | Mod: CPTII,S$GLB,, | Performed by: FAMILY MEDICINE

## 2022-05-31 PROCEDURE — 99396 PREV VISIT EST AGE 40-64: CPT | Mod: S$GLB,,, | Performed by: FAMILY MEDICINE

## 2022-05-31 PROCEDURE — 3078F DIAST BP <80 MM HG: CPT | Mod: CPTII,S$GLB,, | Performed by: FAMILY MEDICINE

## 2022-05-31 PROCEDURE — 99396 PR PREVENTIVE VISIT,EST,40-64: ICD-10-PCS | Mod: S$GLB,,, | Performed by: FAMILY MEDICINE

## 2022-05-31 PROCEDURE — 3078F PR MOST RECENT DIASTOLIC BLOOD PRESSURE < 80 MM HG: ICD-10-PCS | Mod: CPTII,S$GLB,, | Performed by: FAMILY MEDICINE

## 2022-05-31 PROCEDURE — 3075F PR MOST RECENT SYSTOLIC BLOOD PRESS GE 130-139MM HG: ICD-10-PCS | Mod: CPTII,S$GLB,, | Performed by: FAMILY MEDICINE

## 2022-05-31 PROCEDURE — 1159F MED LIST DOCD IN RCRD: CPT | Mod: CPTII,S$GLB,, | Performed by: FAMILY MEDICINE

## 2022-05-31 RX ORDER — AMITRIPTYLINE HYDROCHLORIDE 10 MG/1
10 TABLET, FILM COATED ORAL NIGHTLY
Qty: 90 TABLET | Refills: 1 | Status: SHIPPED | OUTPATIENT
Start: 2022-05-31 | End: 2022-12-06

## 2022-05-31 RX ORDER — PANTOPRAZOLE SODIUM 40 MG/1
40 TABLET, DELAYED RELEASE ORAL DAILY
Qty: 90 TABLET | Refills: 1 | Status: SHIPPED | OUTPATIENT
Start: 2022-05-31 | End: 2022-12-06

## 2022-05-31 RX ORDER — SPIRONOLACTONE 25 MG/1
25 TABLET ORAL DAILY
Qty: 90 TABLET | Refills: 1 | Status: SHIPPED | OUTPATIENT
Start: 2022-05-31 | End: 2022-12-06

## 2022-06-02 ENCOUNTER — PATIENT OUTREACH (OUTPATIENT)
Dept: ADMINISTRATIVE | Facility: HOSPITAL | Age: 47
End: 2022-06-02
Payer: COMMERCIAL

## 2022-06-02 NOTE — PROGRESS NOTES
Subjective:       Patient ID: Genet Martinez is a 46 y.o. female.    Chief Complaint: Annual Exam    Here for well checkup.    Immunizations 1 dose of COVID vaccine [employer].  Tetanus diptheria shot about 7 years ago.    Family history negative for colon cancer.  Hypertension hyperlipidemia in father.  Grandparents coronary artery disease.  No diabetes or cancer in the family.    Past medical history.  Left ankle surgery 2021. Tendon tear.  Had noise ectomy.   1 para 3 A/B 0.  .  Tubal ligation.    Social history nonsmoker nondrinker.  Exercises some but not as much due to her surgery.      Review of Systems   Constitutional: Negative.         Get some skin flushing at times.  Room rare alcohol intake.  No known trigger.     HENT: Negative.    Eyes: Negative.    Respiratory: Negative.    Cardiovascular: Negative.    Gastrointestinal: Negative.    Endocrine: Negative.    Genitourinary: Negative.    Musculoskeletal: Negative.    Skin: Negative.    Allergic/Immunologic: Negative.    Neurological: Negative.    Hematological: Negative.    Psychiatric/Behavioral: Negative.         Doing well on her Trintellix.   All other systems reviewed and are negative.      Objective:      Physical Exam  Vitals and nursing note reviewed.   Constitutional:       Appearance: She is well-developed.   HENT:      Head: Normocephalic and atraumatic.      Nose: Nose normal.   Eyes:      Conjunctiva/sclera: Conjunctivae normal.      Pupils: Pupils are equal, round, and reactive to light.   Neck:      Vascular: No carotid bruit.   Cardiovascular:      Rate and Rhythm: Normal rate and regular rhythm.      Heart sounds: Normal heart sounds.   Pulmonary:      Effort: Pulmonary effort is normal.      Breath sounds: Normal breath sounds.   Abdominal:      General: Bowel sounds are normal.      Palpations: Abdomen is soft.   Musculoskeletal:         General: Normal range of motion.      Cervical back: Normal  range of motion.   Skin:     General: Skin is warm and dry.   Neurological:      Mental Status: She is alert and oriented to person, place, and time.   Psychiatric:         Behavior: Behavior normal.         Thought Content: Thought content normal.         Judgment: Judgment normal.         Assessment:       1. Physical exam    2. Gastroesophageal reflux disease, unspecified whether esophagitis present    3. Migraine with aura and without status migrainosus, not intractable    4. Anxiety    5. Colon cancer screening    6. Encounter for hepatitis C screening test for low risk patient    7. Encounter for screening for human immunodeficiency virus (HIV)        Plan:       Physical exam  -     CBC Auto Differential; Future; Expected date: 05/31/2022  -     Comprehensive Metabolic Panel; Future; Expected date: 05/31/2022  -     Lipid Panel; Future; Expected date: 05/31/2022    Gastroesophageal reflux disease, unspecified whether esophagitis present    Migraine with aura and without status migrainosus, not intractable  -     CBC Auto Differential; Future; Expected date: 05/31/2022  -     Comprehensive Metabolic Panel; Future; Expected date: 05/31/2022  -     Lipid Panel; Future; Expected date: 05/31/2022    Anxiety    Colon cancer screening  -     Cologuard Screening (Multitarget Stool DNA); Future; Expected date: 05/31/2022    Encounter for hepatitis C screening test for low risk patient  -     Hepatitis C Antibody; Future; Expected date: 05/31/2022    Encounter for screening for human immunodeficiency virus (HIV)  -     HIV 1/2 Ag/Ab (4th Gen); Future; Expected date: 05/31/2022    Other orders  -     vortioxetine (TRINTELLIX) 20 mg Tab; Take 1 tablet (20 mg total) by mouth Daily.  Dispense: 90 tablet; Refill: 1  -     spironolactone (ALDACTONE) 25 MG tablet; Take 1 tablet (25 mg total) by mouth once daily.  Dispense: 90 tablet; Refill: 1  -     amitriptyline (ELAVIL) 10 MG tablet; Take 1 tablet (10 mg total) by mouth  every evening.  Dispense: 90 tablet; Refill: 1  -     pantoprazole (PROTONIX) 40 MG tablet; Take 1 tablet (40 mg total) by mouth once daily.  Dispense: 90 tablet; Refill: 1      Reflux is doing okay.  Needs refill of Protonix.  Migraine headaches are doing well.  Elavil refilled.  Cologuard ordered for colon screening.  Pap smear from Dr. Martinez done in April.  CBC CMP lipids hepatitis-C and HIV ordered.

## 2022-06-03 LAB
ALBUMIN SERPL-MCNC: 4.4 G/DL (ref 3.6–5.1)
ALBUMIN/GLOB SERPL: 1.7 (CALC) (ref 1–2.5)
ALP SERPL-CCNC: 80 U/L (ref 31–125)
ALT SERPL-CCNC: 9 U/L (ref 6–29)
AST SERPL-CCNC: 12 U/L (ref 10–35)
BASOPHILS # BLD AUTO: 28 CELLS/UL (ref 0–200)
BASOPHILS NFR BLD AUTO: 0.5 %
BILIRUB SERPL-MCNC: 0.5 MG/DL (ref 0.2–1.2)
BUN SERPL-MCNC: 17 MG/DL (ref 7–25)
BUN/CREAT SERPL: NORMAL (CALC) (ref 6–22)
CALCIUM SERPL-MCNC: 9 MG/DL (ref 8.6–10.2)
CHLORIDE SERPL-SCNC: 105 MMOL/L (ref 98–110)
CHOLEST SERPL-MCNC: 201 MG/DL
CHOLEST/HDLC SERPL: 4 (CALC)
CO2 SERPL-SCNC: 26 MMOL/L (ref 20–32)
CREAT SERPL-MCNC: 0.88 MG/DL (ref 0.5–1.1)
EOSINOPHIL # BLD AUTO: 99 CELLS/UL (ref 15–500)
EOSINOPHIL NFR BLD AUTO: 1.8 %
ERYTHROCYTE [DISTWIDTH] IN BLOOD BY AUTOMATED COUNT: 13.3 % (ref 11–15)
GLOBULIN SER CALC-MCNC: 2.6 G/DL (CALC) (ref 1.9–3.7)
GLUCOSE SERPL-MCNC: 96 MG/DL (ref 65–99)
HCT VFR BLD AUTO: 41.4 % (ref 35–45)
HCV AB S/CO SERPL IA: 0.03
HCV AB SERPL QL IA: NORMAL
HDLC SERPL-MCNC: 50 MG/DL
HGB BLD-MCNC: 13.2 G/DL (ref 11.7–15.5)
HIV 1+2 AB+HIV1 P24 AG SERPL QL IA: NORMAL
LDLC SERPL CALC-MCNC: 132 MG/DL (CALC)
LYMPHOCYTES # BLD AUTO: 1436 CELLS/UL (ref 850–3900)
LYMPHOCYTES NFR BLD AUTO: 26.1 %
MCH RBC QN AUTO: 27.2 PG (ref 27–33)
MCHC RBC AUTO-ENTMCNC: 31.9 G/DL (ref 32–36)
MCV RBC AUTO: 85.2 FL (ref 80–100)
MONOCYTES # BLD AUTO: 473 CELLS/UL (ref 200–950)
MONOCYTES NFR BLD AUTO: 8.6 %
NEUTROPHILS # BLD AUTO: 3465 CELLS/UL (ref 1500–7800)
NEUTROPHILS NFR BLD AUTO: 63 %
NONHDLC SERPL-MCNC: 151 MG/DL (CALC)
PLATELET # BLD AUTO: 275 THOUSAND/UL (ref 140–400)
PMV BLD REES-ECKER: 10.7 FL (ref 7.5–12.5)
POTASSIUM SERPL-SCNC: 4.5 MMOL/L (ref 3.5–5.3)
PROT SERPL-MCNC: 7 G/DL (ref 6.1–8.1)
RBC # BLD AUTO: 4.86 MILLION/UL (ref 3.8–5.1)
SODIUM SERPL-SCNC: 139 MMOL/L (ref 135–146)
TRIGL SERPL-MCNC: 86 MG/DL
WBC # BLD AUTO: 5.5 THOUSAND/UL (ref 3.8–10.8)

## 2022-06-06 DIAGNOSIS — Z12.31 OTHER SCREENING MAMMOGRAM: ICD-10-CM

## 2022-06-08 ENCOUNTER — PATIENT MESSAGE (OUTPATIENT)
Dept: ADMINISTRATIVE | Facility: HOSPITAL | Age: 47
End: 2022-06-08
Payer: COMMERCIAL

## 2022-06-15 ENCOUNTER — PATIENT MESSAGE (OUTPATIENT)
Dept: FAMILY MEDICINE | Facility: CLINIC | Age: 47
End: 2022-06-15
Payer: COMMERCIAL

## 2022-06-22 LAB — NONINV COLON CA DNA+OCC BLD SCRN STL QL: NEGATIVE

## 2022-09-26 RX ORDER — PREDNISONE 20 MG/1
40 TABLET ORAL DAILY
Qty: 10 TABLET | Refills: 0 | Status: SHIPPED | OUTPATIENT
Start: 2022-09-26 | End: 2022-10-01

## 2022-09-26 RX ORDER — AZITHROMYCIN 250 MG/1
TABLET, FILM COATED ORAL
Qty: 6 TABLET | Refills: 0 | Status: SHIPPED | OUTPATIENT
Start: 2022-09-26 | End: 2023-06-22

## 2022-12-05 NOTE — TELEPHONE ENCOUNTER
No new care gaps identified.  [facility] Embedded Care Gaps. Reference number: [id number]. 12/05/2022   12:41:35 PM CST

## 2022-12-06 RX ORDER — AMITRIPTYLINE HYDROCHLORIDE 10 MG/1
10 TABLET, FILM COATED ORAL NIGHTLY
Qty: 90 TABLET | Refills: 1 | Status: SHIPPED | OUTPATIENT
Start: 2022-12-06 | End: 2024-02-22

## 2022-12-06 RX ORDER — VORTIOXETINE 20 MG/1
TABLET, FILM COATED ORAL
Qty: 90 TABLET | Refills: 1 | Status: SHIPPED | OUTPATIENT
Start: 2022-12-06 | End: 2023-06-22 | Stop reason: SDUPTHER

## 2022-12-06 RX ORDER — SPIRONOLACTONE 25 MG/1
25 TABLET ORAL DAILY
Qty: 90 TABLET | Refills: 1 | Status: SHIPPED | OUTPATIENT
Start: 2022-12-06 | End: 2023-06-22 | Stop reason: SDUPTHER

## 2022-12-06 RX ORDER — PANTOPRAZOLE SODIUM 40 MG/1
40 TABLET, DELAYED RELEASE ORAL DAILY
Qty: 90 TABLET | Refills: 1 | Status: SHIPPED | OUTPATIENT
Start: 2022-12-06 | End: 2023-06-22 | Stop reason: SDUPTHER

## 2022-12-06 NOTE — TELEPHONE ENCOUNTER
Refill Decision Note   Genet Silvadrine  is requesting a refill authorization.  Brief Assessment and Rationale for Refill:  Approve     Medication Therapy Plan:       Medication Reconciliation Completed: No   Comments:     No Care Gaps recommended.     Note composed:12:13 PM 12/06/2022

## 2022-12-08 ENCOUNTER — PATIENT MESSAGE (OUTPATIENT)
Dept: ADMINISTRATIVE | Facility: OTHER | Age: 47
End: 2022-12-08
Payer: COMMERCIAL

## 2022-12-09 DIAGNOSIS — M79.672 LEFT FOOT PAIN: Primary | ICD-10-CM

## 2022-12-13 ENCOUNTER — HOSPITAL ENCOUNTER (OUTPATIENT)
Dept: RADIOLOGY | Facility: HOSPITAL | Age: 47
Discharge: HOME | End: 2022-12-13
Attending: ORTHOPAEDIC SURGERY
Payer: COMMERCIAL

## 2022-12-13 ENCOUNTER — OFFICE VISIT (OUTPATIENT)
Dept: ORTHOPEDICS | Facility: CLINIC | Age: 47
End: 2022-12-13
Payer: COMMERCIAL

## 2022-12-13 VITALS — WEIGHT: 170 LBS | BODY MASS INDEX: 30.12 KG/M2 | HEIGHT: 63 IN

## 2022-12-13 DIAGNOSIS — M79.672 LEFT FOOT PAIN: ICD-10-CM

## 2022-12-13 DIAGNOSIS — M84.372A STRESS FRACTURE OF LEFT ANKLE, INITIAL ENCOUNTER: Primary | ICD-10-CM

## 2022-12-13 PROCEDURE — 73610 X-RAY EXAM OF ANKLE: CPT | Mod: TC,PO,LT

## 2022-12-13 PROCEDURE — 1160F PR REVIEW ALL MEDS BY PRESCRIBER/CLIN PHARMACIST DOCUMENTED: ICD-10-PCS | Mod: CPTII,S$GLB,, | Performed by: ORTHOPAEDIC SURGERY

## 2022-12-13 PROCEDURE — 3008F PR BODY MASS INDEX (BMI) DOCUMENTED: ICD-10-PCS | Mod: CPTII,S$GLB,, | Performed by: ORTHOPAEDIC SURGERY

## 2022-12-13 PROCEDURE — 1159F PR MEDICATION LIST DOCUMENTED IN MEDICAL RECORD: ICD-10-PCS | Mod: CPTII,S$GLB,, | Performed by: ORTHOPAEDIC SURGERY

## 2022-12-13 PROCEDURE — 99214 OFFICE O/P EST MOD 30 MIN: CPT | Mod: S$GLB,,, | Performed by: ORTHOPAEDIC SURGERY

## 2022-12-13 PROCEDURE — 73610 X-RAY EXAM OF ANKLE: CPT | Mod: 26,LT,, | Performed by: RADIOLOGY

## 2022-12-13 PROCEDURE — 73630 X-RAY EXAM OF FOOT: CPT | Mod: TC,PO,LT

## 2022-12-13 PROCEDURE — 99999 PR PBB SHADOW E&M-EST. PATIENT-LVL II: CPT | Mod: PBBFAC,,, | Performed by: ORTHOPAEDIC SURGERY

## 2022-12-13 PROCEDURE — 1160F RVW MEDS BY RX/DR IN RCRD: CPT | Mod: CPTII,S$GLB,, | Performed by: ORTHOPAEDIC SURGERY

## 2022-12-13 PROCEDURE — 99214 PR OFFICE/OUTPT VISIT, EST, LEVL IV, 30-39 MIN: ICD-10-PCS | Mod: S$GLB,,, | Performed by: ORTHOPAEDIC SURGERY

## 2022-12-13 PROCEDURE — 73630 XR FOOT COMPLETE 3 VIEW LEFT: ICD-10-PCS | Mod: 26,LT,, | Performed by: RADIOLOGY

## 2022-12-13 PROCEDURE — 73630 X-RAY EXAM OF FOOT: CPT | Mod: 26,LT,, | Performed by: RADIOLOGY

## 2022-12-13 PROCEDURE — 3008F BODY MASS INDEX DOCD: CPT | Mod: CPTII,S$GLB,, | Performed by: ORTHOPAEDIC SURGERY

## 2022-12-13 PROCEDURE — 1159F MED LIST DOCD IN RCRD: CPT | Mod: CPTII,S$GLB,, | Performed by: ORTHOPAEDIC SURGERY

## 2022-12-13 PROCEDURE — 99999 PR PBB SHADOW E&M-EST. PATIENT-LVL II: ICD-10-PCS | Mod: PBBFAC,,, | Performed by: ORTHOPAEDIC SURGERY

## 2022-12-13 PROCEDURE — 73610 XR ANKLE COMPLETE 3 VIEW LEFT: ICD-10-PCS | Mod: 26,LT,, | Performed by: RADIOLOGY

## 2022-12-13 NOTE — PROGRESS NOTES
Status/Diagnosis: Suspected Left medial malleolus stress fracture  Date of Surgery: Left accessory navicular excision; Modified Kidner and PTT advancement by Dr. Devi on 2021.  Date of Injury: none; DOO: 3+ months  Return visit: Will call after MRI complete  X-rays on Return: none    Chief Complaint:   Chief Complaint   Patient presents with    Left Ankle - Pain     Present History:  Genet Martinez is a 47 y.o. female who presents today for new patient evaluation.  Previous surgical intervention by Dr. Devi as outlined above.  Patient did well postoperatively however has noticed over the last several months worsening left medial sided ankle pain proximal to the previous surgical scar.  Denies any history of injury.  No pain at rest, increased with weight-bearing.  Specifically notice pain 1st thing in the morning upon taking for steps.  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  Denies any numbness or tingling.  Denies any radiating symptoms. no previous evaluation or treatment.  Has been taking oral NSAIDs intermittently as needed for pain with moderate symptomatic relief.  No recent immobilization.      Past Medical History:   Diagnosis Date    Bilateral patent pressure equalization (PE) tubes     as a child       Past Surgical History:   Procedure Laterality Date     SECTION      RECONSTRUCTION OF POSTERIOR TIBIAL TENDON WITH SURGICAL REMOVAL OF ACCESSORY NAVICULAR BONE OF FOOT Left 2021    Procedure: TENDON RECONSTRUCTION, POSTERIOR TIBIAL, WITH ACCESSORY NAVICULAR BONE EXCISION;  Surgeon: Mario Alberto Devi MD;  Location: [facility] OR;  Service: Orthopedics;  Laterality: Left;    REPAIR OF TENDON OF LOWER EXTREMITY Left 2021    Procedure: REPAIR, TENDON, LOWER EXTREMITY;  Surgeon: Mario Alberto Devi MD;  Location: [facility] OR;  Service: Orthopedics;  Laterality: Left;    TUBAL LIGATION      TYMPANOSTOMY TUBE PLACEMENT      WISDOM TOOTH EXTRACTION          Current Outpatient Medications   Medication Sig    ALPRAZolam (XANAX) 0.25 MG tablet Take 1 tablet (0.25 mg total) by mouth daily as needed for Anxiety.    amitriptyline (ELAVIL) 10 MG tablet Take 1 tablet (10 mg total) by mouth every evening.    azithromycin (Z-ISHA) 250 MG tablet Take 2 tablets by mouth on day 1; Take 1 tablet by mouth on days 2-5    butalbital-acetaminophen-caffeine -40 mg (FIORICET, ESGIC) -40 mg per tablet Take 1 tablet by mouth every 6 (six) hours as needed for Headaches.    pantoprazole (PROTONIX) 40 MG tablet TAKE 1 TABLET (40 MG TOTAL) BY MOUTH ONCE DAILY.    spironolactone (ALDACTONE) 25 MG tablet Take 1 tablet (25 mg total) by mouth once daily.    TRINTELLIX 20 mg Tab TAKE 1 TABLET (20 MG TOTAL) BY MOUTH DAILY.     No current facility-administered medications for this visit.       Review of patient's allergies indicates:   Allergen Reactions    Penicillins Hives       Family History   Problem Relation Age of Onset    Hypertension Father     Hyperlipidemia Father        Social History     Socioeconomic History    Marital status:    Tobacco Use    Smoking status: Never    Smokeless tobacco: Never   Substance and Sexual Activity    Alcohol use: Yes    Drug use: No    Sexual activity: Yes     Partners: Male       Physical exam:  There were no vitals filed for this visit.  Body mass index is 30.11 kg/m².  General: In no apparent distress; well developed and well nourished.  HEENT: normocephalic; atraumatic.  Cardiovascular: regular rate.  Respiratory: no increased work of breathing.  Musculoskeletal:   Gait: mild antalgic  Inspection:  Previous surgical scar over the insertion of the posterior tibial tendon is well healed.  No pain localized to this site.  Some degree.  Incisional numbness.  Mild-to-moderate flatfoot deformity with associated hindfoot valgus.  No significant forefoot abduction.  Good single limb heel rise bilaterally. No pain with this.  Patient  localizes pain to the medial distal tibia at the level of the medial malleolus at the level of the plafond.  Negative Tinel and compression testing over the saphenous nerve.  Full painless ankle range of motion.  No laxity with anterior drawer testing.  Silfverskiold: positive  Alignment:  Knee: neutral               Ankle: neutral              Hindfoot: increased valgus              Forefoot: neutral   Strength:              Dorsiflexion 5/5  Plantar flexion 5/5  Inversion 5/5   Eversion 5/5   Sensation:              SILT distally   ROM:              Ankle: Full and painless.              Subtalar: Painless inversion and eversion   Pulses: 2+ DP/PT pulses.                   Imaging Studies/Outside documentation:  I have ordered/reviewed/interpreted the following images/outside documentation:  1. Weight bearing 3-views of Left foot and ankle:  Questionable beaking at the medial malleolus with suspected stress reaction.  Mild-to-moderate decreased calcaneal pitch.  Talonavicular uncoverage within normal limits.  Normal Cherie's angle.  Congruent ankle mortise.  No significant degenerative joint changes.  Incidental osteophyte at the Achilles tendon insertion.        Assessment:  Genet Martinez is a 47 y.o. female with Suspected Left medial malleolus stress fracture.     Plan:   Clinical and radiographic findings were discussed.  Recommend conservative management to include patient continue over-the-counter oral NSAIDs.  Return to tall boot for immobilization for the next 14 days.  May weight bear as tolerated.  Patient provided with increased arch supports [employer] handout.  We will order MRI of the left ankle without contrast for further evaluation for suspected stress fracture. patient was understanding.  All questions were answered.  Will call with MRI results once complete.    This note was created using voice recognition software and may contain grammatical errors.

## 2022-12-19 ENCOUNTER — HOSPITAL ENCOUNTER (OUTPATIENT)
Dept: RADIOLOGY | Facility: HOSPITAL | Age: 47
Discharge: HOME | End: 2022-12-19
Attending: ORTHOPAEDIC SURGERY
Payer: COMMERCIAL

## 2022-12-19 ENCOUNTER — TELEPHONE (OUTPATIENT)
Dept: ORTHOPEDICS | Facility: CLINIC | Age: 47
End: 2022-12-19
Payer: COMMERCIAL

## 2022-12-19 DIAGNOSIS — M84.372A STRESS FRACTURE OF LEFT ANKLE, INITIAL ENCOUNTER: ICD-10-CM

## 2022-12-19 PROCEDURE — 73721 MRI JNT OF LWR EXTRE W/O DYE: CPT | Mod: TC,LT

## 2022-12-19 PROCEDURE — 73721 MRI JNT OF LWR EXTRE W/O DYE: CPT | Mod: 26,LT,, | Performed by: RADIOLOGY

## 2022-12-19 PROCEDURE — 73721 MRI ANKLE WITHOUT CONTRAST LEFT: ICD-10-PCS | Mod: 26,LT,, | Performed by: RADIOLOGY

## 2022-12-19 NOTE — TELEPHONE ENCOUNTER
Spoke to patient regarding MRI results.  No evidence of distal tibial stress fracture.  Patient with chronic appearing tendinosis.  Questionable partial thickness tear, although difficult to assess in patient with history of previous Kidner procedure.  Recommend conservative management at this time as previously discussed.  Over-the-counter oral NSAIDs as needed for pain, increased arch support inserts, and tall boot wear for the next 2-3 weeks.  We did discuss the potential for future surgical intervention along the lines of flatfoot reconstruction that would involve debridement of the posterior tibial tendon and subsequent tendon transfer.  We will continue to monitor this closely.  If no improvement in the next 4-6 weeks, patient will call for return visit.

## 2023-06-22 ENCOUNTER — OFFICE VISIT (OUTPATIENT)
Dept: FAMILY MEDICINE | Facility: CLINIC | Age: 48
End: 2023-06-22
Payer: COMMERCIAL

## 2023-06-22 VITALS
HEIGHT: 63 IN | SYSTOLIC BLOOD PRESSURE: 120 MMHG | WEIGHT: 181.69 LBS | DIASTOLIC BLOOD PRESSURE: 78 MMHG | BODY MASS INDEX: 32.19 KG/M2 | OXYGEN SATURATION: 98 % | RESPIRATION RATE: 18 BRPM | TEMPERATURE: 98 F | HEART RATE: 78 BPM

## 2023-06-22 DIAGNOSIS — G43.109 MIGRAINE WITH AURA AND WITHOUT STATUS MIGRAINOSUS, NOT INTRACTABLE: ICD-10-CM

## 2023-06-22 DIAGNOSIS — K21.9 GASTROESOPHAGEAL REFLUX DISEASE, UNSPECIFIED WHETHER ESOPHAGITIS PRESENT: ICD-10-CM

## 2023-06-22 DIAGNOSIS — F41.9 ANXIETY: ICD-10-CM

## 2023-06-22 DIAGNOSIS — I95.1 ORTHOSTASIS: ICD-10-CM

## 2023-06-22 DIAGNOSIS — Z00.00 PHYSICAL EXAM: Primary | ICD-10-CM

## 2023-06-22 PROCEDURE — 99396 PREV VISIT EST AGE 40-64: CPT | Mod: S$GLB,,, | Performed by: FAMILY MEDICINE

## 2023-06-22 PROCEDURE — 3008F PR BODY MASS INDEX (BMI) DOCUMENTED: ICD-10-PCS | Mod: CPTII,S$GLB,, | Performed by: FAMILY MEDICINE

## 2023-06-22 PROCEDURE — 3078F PR MOST RECENT DIASTOLIC BLOOD PRESSURE < 80 MM HG: ICD-10-PCS | Mod: CPTII,S$GLB,, | Performed by: FAMILY MEDICINE

## 2023-06-22 PROCEDURE — 1159F PR MEDICATION LIST DOCUMENTED IN MEDICAL RECORD: ICD-10-PCS | Mod: CPTII,S$GLB,, | Performed by: FAMILY MEDICINE

## 2023-06-22 PROCEDURE — 1160F RVW MEDS BY RX/DR IN RCRD: CPT | Mod: CPTII,S$GLB,, | Performed by: FAMILY MEDICINE

## 2023-06-22 PROCEDURE — 99213 PR OFFICE/OUTPT VISIT, EST, LEVL III, 20-29 MIN: ICD-10-PCS | Mod: 25,S$GLB,, | Performed by: FAMILY MEDICINE

## 2023-06-22 PROCEDURE — 1159F MED LIST DOCD IN RCRD: CPT | Mod: CPTII,S$GLB,, | Performed by: FAMILY MEDICINE

## 2023-06-22 PROCEDURE — 3008F BODY MASS INDEX DOCD: CPT | Mod: CPTII,S$GLB,, | Performed by: FAMILY MEDICINE

## 2023-06-22 PROCEDURE — 3074F SYST BP LT 130 MM HG: CPT | Mod: CPTII,S$GLB,, | Performed by: FAMILY MEDICINE

## 2023-06-22 PROCEDURE — 99213 OFFICE O/P EST LOW 20 MIN: CPT | Mod: 25,S$GLB,, | Performed by: FAMILY MEDICINE

## 2023-06-22 PROCEDURE — 1160F PR REVIEW ALL MEDS BY PRESCRIBER/CLIN PHARMACIST DOCUMENTED: ICD-10-PCS | Mod: CPTII,S$GLB,, | Performed by: FAMILY MEDICINE

## 2023-06-22 PROCEDURE — 3078F DIAST BP <80 MM HG: CPT | Mod: CPTII,S$GLB,, | Performed by: FAMILY MEDICINE

## 2023-06-22 PROCEDURE — 99396 PR PREVENTIVE VISIT,EST,40-64: ICD-10-PCS | Mod: S$GLB,,, | Performed by: FAMILY MEDICINE

## 2023-06-22 PROCEDURE — 3074F PR MOST RECENT SYSTOLIC BLOOD PRESSURE < 130 MM HG: ICD-10-PCS | Mod: CPTII,S$GLB,, | Performed by: FAMILY MEDICINE

## 2023-06-22 RX ORDER — ALPRAZOLAM 0.25 MG/1
0.25 TABLET ORAL DAILY PRN
Qty: 30 TABLET | Refills: 2 | Status: SHIPPED | OUTPATIENT
Start: 2023-06-22

## 2023-06-22 RX ORDER — VORTIOXETINE 20 MG/1
TABLET, FILM COATED ORAL
Qty: 90 TABLET | Refills: 1 | Status: SHIPPED | OUTPATIENT
Start: 2023-06-22 | End: 2023-12-18 | Stop reason: SDUPTHER

## 2023-06-22 RX ORDER — NORTRIPTYLINE HYDROCHLORIDE 10 MG/1
10 CAPSULE ORAL NIGHTLY
COMMUNITY
End: 2023-06-22 | Stop reason: SDUPTHER

## 2023-06-22 RX ORDER — PANTOPRAZOLE SODIUM 40 MG/1
40 TABLET, DELAYED RELEASE ORAL DAILY
Qty: 90 TABLET | Refills: 1 | Status: SHIPPED | OUTPATIENT
Start: 2023-06-22 | End: 2023-12-18 | Stop reason: SDUPTHER

## 2023-06-22 RX ORDER — NORTRIPTYLINE HYDROCHLORIDE 10 MG/1
10 CAPSULE ORAL NIGHTLY
Qty: 90 CAPSULE | Refills: 1 | Status: SHIPPED | OUTPATIENT
Start: 2023-06-22 | End: 2024-02-22

## 2023-06-22 RX ORDER — SPIRONOLACTONE 25 MG/1
25 TABLET ORAL DAILY
Qty: 90 TABLET | Refills: 1 | Status: SHIPPED | OUTPATIENT
Start: 2023-06-22 | End: 2024-01-18 | Stop reason: SDUPTHER

## 2023-06-22 RX ORDER — BUTALBITAL, ACETAMINOPHEN AND CAFFEINE 50; 325; 40 MG/1; MG/1; MG/1
1 TABLET ORAL EVERY 6 HOURS PRN
Qty: 60 TABLET | Refills: 0 | Status: SHIPPED | OUTPATIENT
Start: 2023-06-22

## 2023-06-25 NOTE — PROGRESS NOTES
Subjective:       Patient ID: Genet Martinez is a 47 y.o. female.    Chief Complaint: Medication Refill    Here for physical.      Social history nonsmoker no alcohol intake.    Family history negative for thyroid disease.  No changes.      Past medical history.  BMI of 32.  Gastroesophageal reflux disease needs refill.  Migraine headaches needs refill of medication.  Needs Fioricet.  On Elavil.  Anxiety p.r.n. Xanax.  And Trintellix.  Aldactone daily for skin.    Review of Systems   Constitutional:  Positive for unexpected weight change.   HENT: Negative.     Eyes: Negative.    Respiratory: Negative.     Cardiovascular: Negative.    Gastrointestinal: Negative.    Endocrine: Negative.    Genitourinary: Negative.    Musculoskeletal: Negative.    Skin: Negative.    Allergic/Immunologic: Negative.    Neurological: Negative.    Hematological: Negative.    Psychiatric/Behavioral:  The patient is nervous/anxious.    All other systems reviewed and are negative.    Objective:      Physical Exam  Vitals and nursing note reviewed.   Constitutional:       Appearance: Normal appearance. She is well-developed and normal weight.   HENT:      Head: Normocephalic and atraumatic.      Right Ear: Tympanic membrane normal.      Left Ear: Tympanic membrane normal.      Nose: Nose normal.      Mouth/Throat:      Mouth: Mucous membranes are moist.   Eyes:      Conjunctiva/sclera: Conjunctivae normal.      Pupils: Pupils are equal, round, and reactive to light.   Neck:      Vascular: No carotid bruit.   Cardiovascular:      Rate and Rhythm: Normal rate and regular rhythm.      Pulses: Normal pulses.      Heart sounds: Normal heart sounds. No murmur heard.    No gallop.   Pulmonary:      Effort: Pulmonary effort is normal.      Breath sounds: Normal breath sounds.   Abdominal:      General: Bowel sounds are normal.      Palpations: Abdomen is soft.      Tenderness: There is no abdominal tenderness.   Musculoskeletal:         General:  Normal range of motion.      Cervical back: Normal range of motion.      Right lower leg: No edema.      Left lower leg: No edema.   Lymphadenopathy:      Cervical: No cervical adenopathy.   Skin:     General: Skin is warm and dry.   Neurological:      General: No focal deficit present.      Mental Status: She is alert and oriented to person, place, and time.   Psychiatric:         Behavior: Behavior normal.         Thought Content: Thought content normal.         Judgment: Judgment normal.       Assessment:       1. Physical exam    2. Gastroesophageal reflux disease, unspecified whether esophagitis present    3. Anxiety    4. Migraine with aura and without status migrainosus, not intractable    5. BMI 32.0-32.9,adult    6. Orthostasis        Plan:       Physical exam  -     Hemoglobin A1C; Future; Expected date: 06/22/2023  -     CBC Auto Differential; Future; Expected date: 06/22/2023  -     Comprehensive Metabolic Panel; Future; Expected date: 06/22/2023  -     TSH; Future; Expected date: 06/22/2023  -     Lipid Panel; Future; Expected date: 06/22/2023    Gastroesophageal reflux disease, unspecified whether esophagitis present    Anxiety    Migraine with aura and without status migrainosus, not intractable  -     butalbital-acetaminophen-caffeine -40 mg (FIORICET, ESGIC) -40 mg per tablet; Take 1 tablet by mouth every 6 (six) hours as needed for Headaches.  Dispense: 60 tablet; Refill: 0    BMI 32.0-32.9,adult    Orthostasis    Other orders  -     nortriptyline (PAMELOR) 10 MG capsule; Take 1 capsule (10 mg total) by mouth every evening.  Dispense: 90 capsule; Refill: 1  -     vortioxetine (TRINTELLIX) 20 mg Tab; TAKE 1 TABLET (20 MG TOTAL) BY MOUTH DAILY.  Dispense: 90 tablet; Refill: 1  -     spironolactone (ALDACTONE) 25 MG tablet; Take 1 tablet (25 mg total) by mouth once daily.  Dispense: 90 tablet; Refill: 1  -     pantoprazole (PROTONIX) 40 MG tablet; Take 1 tablet (40 mg total) by mouth once  daily.  Dispense: 90 tablet; Refill: 1  -     ALPRAZolam (XANAX) 0.25 MG tablet; Take 1 tablet (0.25 mg total) by mouth daily as needed for Anxiety.  Dispense: 30 tablet; Refill: 2    Refill medications.  CBC CMP lipids TSH A1c.  Low-fat diet.  Mammogram as scheduled next week.    In addition to routine physical.  Anxiety issues p.r.n. Xanax.  On Trintellix that is helping her.  But became lightheaded with just 1 drink.  On Aldactone for her skin.  On Elavil possibly causing some orthostasis.    Physical examination.  Vital signs noted.  Neck without bruit.  Chest clear.  Heart regular rate and rhythm.  Extremities without edema positive pedal pulses.  Neurologically intact.      Impression.  Migraine headaches.  Orthostasis.  Anxiety.    Plan discontinue the Elavil and change to Pamelor 10 mg HS 90 with a refill.  Because of the weight gain she has had recommend check she check on Wegovy.  Unable to take Adipex duty increased heart rate with it in the past.  There is no history of thyroid cancer in the family and she is not had pancreatitis.

## 2023-06-28 LAB
ALBUMIN SERPL-MCNC: 4.3 G/DL (ref 3.6–5.1)
ALBUMIN/GLOB SERPL: 1.6 (CALC) (ref 1–2.5)
ALP SERPL-CCNC: 64 U/L (ref 31–125)
ALT SERPL-CCNC: 10 U/L (ref 6–29)
AST SERPL-CCNC: 13 U/L (ref 10–35)
BASOPHILS # BLD AUTO: 41 CELLS/UL (ref 0–200)
BASOPHILS NFR BLD AUTO: 0.6 %
BILIRUB SERPL-MCNC: 0.5 MG/DL (ref 0.2–1.2)
BUN SERPL-MCNC: 17 MG/DL (ref 7–25)
BUN/CREAT SERPL: NORMAL (CALC) (ref 6–22)
CALCIUM SERPL-MCNC: 9.3 MG/DL (ref 8.6–10.2)
CHLORIDE SERPL-SCNC: 104 MMOL/L (ref 98–110)
CHOLEST SERPL-MCNC: 206 MG/DL
CHOLEST/HDLC SERPL: 4.3 (CALC)
CO2 SERPL-SCNC: 26 MMOL/L (ref 20–32)
CREAT SERPL-MCNC: 0.91 MG/DL (ref 0.5–0.99)
EGFR: 78 ML/MIN/1.73M2
EOSINOPHIL # BLD AUTO: 138 CELLS/UL (ref 15–500)
EOSINOPHIL NFR BLD AUTO: 2 %
ERYTHROCYTE [DISTWIDTH] IN BLOOD BY AUTOMATED COUNT: 13.6 % (ref 11–15)
GLOBULIN SER CALC-MCNC: 2.7 G/DL (CALC) (ref 1.9–3.7)
GLUCOSE SERPL-MCNC: 99 MG/DL (ref 65–99)
HBA1C MFR BLD: 5.5 % OF TOTAL HGB
HCT VFR BLD AUTO: 41.7 % (ref 35–45)
HDLC SERPL-MCNC: 48 MG/DL
HGB BLD-MCNC: 13.4 G/DL (ref 11.7–15.5)
LDLC SERPL CALC-MCNC: 135 MG/DL (CALC)
LYMPHOCYTES # BLD AUTO: 1732 CELLS/UL (ref 850–3900)
LYMPHOCYTES NFR BLD AUTO: 25.1 %
MCH RBC QN AUTO: 27.3 PG (ref 27–33)
MCHC RBC AUTO-ENTMCNC: 32.1 G/DL (ref 32–36)
MCV RBC AUTO: 85.1 FL (ref 80–100)
MONOCYTES # BLD AUTO: 593 CELLS/UL (ref 200–950)
MONOCYTES NFR BLD AUTO: 8.6 %
NEUTROPHILS # BLD AUTO: 4395 CELLS/UL (ref 1500–7800)
NEUTROPHILS NFR BLD AUTO: 63.7 %
NONHDLC SERPL-MCNC: 158 MG/DL (CALC)
PLATELET # BLD AUTO: 307 THOUSAND/UL (ref 140–400)
PMV BLD REES-ECKER: 10.4 FL (ref 7.5–12.5)
POTASSIUM SERPL-SCNC: 4.2 MMOL/L (ref 3.5–5.3)
PROT SERPL-MCNC: 7 G/DL (ref 6.1–8.1)
RBC # BLD AUTO: 4.9 MILLION/UL (ref 3.8–5.1)
SODIUM SERPL-SCNC: 138 MMOL/L (ref 135–146)
TRIGL SERPL-MCNC: 121 MG/DL
TSH SERPL-ACNC: 1.43 MIU/L
WBC # BLD AUTO: 6.9 THOUSAND/UL (ref 3.8–10.8)

## 2023-07-06 ENCOUNTER — PATIENT MESSAGE (OUTPATIENT)
Dept: FAMILY MEDICINE | Facility: CLINIC | Age: 48
End: 2023-07-06
Payer: COMMERCIAL

## 2023-07-07 ENCOUNTER — PATIENT MESSAGE (OUTPATIENT)
Dept: FAMILY MEDICINE | Facility: CLINIC | Age: 48
End: 2023-07-07
Payer: COMMERCIAL

## 2023-07-07 RX ORDER — SEMAGLUTIDE 0.25 MG/.5ML
0.25 INJECTION, SOLUTION SUBCUTANEOUS
Qty: 2 ML | Refills: 0 | Status: SHIPPED | OUTPATIENT
Start: 2023-07-07 | End: 2024-02-22

## 2023-07-10 ENCOUNTER — TELEPHONE (OUTPATIENT)
Dept: PHARMACY | Facility: CLINIC | Age: 48
End: 2023-07-10
Payer: COMMERCIAL

## 2023-07-14 ENCOUNTER — PATIENT MESSAGE (OUTPATIENT)
Dept: FAMILY MEDICINE | Facility: CLINIC | Age: 48
End: 2023-07-14
Payer: COMMERCIAL

## 2023-12-18 RX ORDER — VORTIOXETINE 20 MG/1
TABLET, FILM COATED ORAL
Qty: 90 TABLET | Refills: 1 | Status: SHIPPED | OUTPATIENT
Start: 2023-12-18 | End: 2024-01-31 | Stop reason: SDUPTHER

## 2023-12-18 RX ORDER — PANTOPRAZOLE SODIUM 40 MG/1
40 TABLET, DELAYED RELEASE ORAL DAILY
Qty: 90 TABLET | Refills: 1 | Status: SHIPPED | OUTPATIENT
Start: 2023-12-18

## 2023-12-18 NOTE — TELEPHONE ENCOUNTER
No care due was identified.  Health [facility] Embedded Care Due Messages. Reference number: [id number].   12/18/2023 4:12:01 PM CST

## 2024-01-18 RX ORDER — SPIRONOLACTONE 25 MG/1
25 TABLET ORAL DAILY
Qty: 90 TABLET | Refills: 1 | Status: SHIPPED | OUTPATIENT
Start: 2024-01-18 | End: 2024-05-27 | Stop reason: SDUPTHER

## 2024-01-18 NOTE — TELEPHONE ENCOUNTER
No care due was identified.  Health [facility] Embedded Care Due Messages. Reference number: [id number].   1/18/2024 9:56:40 AM CST

## 2024-01-31 ENCOUNTER — PATIENT MESSAGE (OUTPATIENT)
Dept: FAMILY MEDICINE | Facility: CLINIC | Age: 49
End: 2024-01-31
Payer: COMMERCIAL

## 2024-01-31 RX ORDER — VORTIOXETINE 20 MG/1
TABLET, FILM COATED ORAL
Qty: 30 TABLET | Refills: 0 | Status: SHIPPED | OUTPATIENT
Start: 2024-01-31 | End: 2024-02-22 | Stop reason: CLARIF

## 2024-02-06 ENCOUNTER — PATIENT MESSAGE (OUTPATIENT)
Dept: FAMILY MEDICINE | Facility: CLINIC | Age: 49
End: 2024-02-06
Payer: COMMERCIAL

## 2024-02-06 RX ORDER — VENLAFAXINE HYDROCHLORIDE 75 MG/1
75 CAPSULE, EXTENDED RELEASE ORAL DAILY
Qty: 90 CAPSULE | Refills: 0 | Status: SHIPPED | OUTPATIENT
Start: 2024-02-06 | End: 2024-05-01 | Stop reason: SDUPTHER

## 2024-02-22 ENCOUNTER — HOSPITAL ENCOUNTER (OUTPATIENT)
Dept: RADIOLOGY | Facility: HOSPITAL | Age: 49
Discharge: HOME | End: 2024-02-22
Payer: COMMERCIAL

## 2024-02-22 ENCOUNTER — OFFICE VISIT (OUTPATIENT)
Dept: FAMILY MEDICINE | Facility: CLINIC | Age: 49
End: 2024-02-22
Payer: COMMERCIAL

## 2024-02-22 VITALS
HEIGHT: 63 IN | OXYGEN SATURATION: 98 % | SYSTOLIC BLOOD PRESSURE: 130 MMHG | HEART RATE: 74 BPM | TEMPERATURE: 98 F | DIASTOLIC BLOOD PRESSURE: 88 MMHG | WEIGHT: 177.88 LBS | RESPIRATION RATE: 18 BRPM | BODY MASS INDEX: 31.52 KG/M2

## 2024-02-22 DIAGNOSIS — M79.622 LEFT UPPER ARM PAIN: ICD-10-CM

## 2024-02-22 DIAGNOSIS — M79.622 LEFT UPPER ARM PAIN: Primary | ICD-10-CM

## 2024-02-22 PROCEDURE — 73060 X-RAY EXAM OF HUMERUS: CPT | Mod: TC,LT

## 2024-02-22 PROCEDURE — 99214 OFFICE O/P EST MOD 30 MIN: CPT | Mod: S$GLB,,,

## 2024-02-22 PROCEDURE — 99999 PR PBB SHADOW E&M-EST. PATIENT-LVL IV: CPT | Mod: PBBFAC,,,

## 2024-02-22 RX ORDER — METHYLPREDNISOLONE 4 MG/1
TABLET ORAL
Qty: 21 EACH | Refills: 0 | Status: SHIPPED | OUTPATIENT
Start: 2024-02-22 | End: 2024-03-14

## 2024-02-22 RX ORDER — METHOCARBAMOL 500 MG/1
500 TABLET, FILM COATED ORAL 3 TIMES DAILY PRN
Qty: 30 TABLET | Refills: 0 | Status: SHIPPED | OUTPATIENT
Start: 2024-02-22 | End: 2024-03-03

## 2024-02-22 NOTE — PROGRESS NOTES
Subjective:       Patient ID: Genet Martinez is a 48 y.o. female.    Chief Complaint: Arm Pain (left)    Genet Martinez is a 48-year-old female patient who presents to clinic today with complaints of left upper arm pain.  Patient states she has had pain in her left upper arm for about a month.  She describes pain is a aching pain.  Pain is worse when she sleeps especially if she lays on her left side.  Pain will radiate down into her elbow at times.  Pain is intermittent but we will be present with certain movements and if she lifts anything.  She has been taking ibuprofen with some relief.  She has no known injury.      Review of patient's allergies indicates:   Allergen Reactions    Penicillins Hives     Social Determinants of Health     Tobacco Use: Low Risk  (2024)    Patient History     Smoking Tobacco Use: Never     Smokeless Tobacco Use: Never     Passive Exposure: Not on file   Alcohol Use: Not on file   Financial Resource Strain: Not on file   Food Insecurity: Not on file   Transportation Needs: Not on file   Physical Activity: Not on file   Stress: No Stress Concern Present (2020)    [employer] [employer] - Occupational Stress Questionnaire     Feeling of Stress : Not at all   Social Connections: Not on file   Housing Stability: Not on file   Depression: Low Risk  (2024)    Depression     Last PHQ-4: Flowsheet Data: 0      Past Medical History:   Diagnosis Date    Bilateral patent pressure equalization (PE) tubes     as a child      Past Surgical History:   Procedure Laterality Date     SECTION      RECONSTRUCTION OF POSTERIOR TIBIAL TENDON WITH SURGICAL REMOVAL OF ACCESSORY NAVICULAR BONE OF FOOT Left 2021    Procedure: TENDON RECONSTRUCTION, POSTERIOR TIBIAL, WITH ACCESSORY NAVICULAR BONE EXCISION;  Surgeon: Mario Alberto Devi MD;  Location[facility];  Service: Orthopedics;  Laterality: Left;    REPAIR OF TENDON OF LOWER EXTREMITY Left  12/8/2021    Procedure: REPAIR, TENDON, LOWER EXTREMITY;  Surgeon: Mario Alberto Devi MD;  Location: [facility] OR;  Service: Orthopedics;  Laterality: Left;    TUBAL LIGATION      TYMPANOSTOMY TUBE PLACEMENT      WISDOM TOOTH EXTRACTION        Social History     Socioeconomic History    Marital status:          Current Outpatient Medications:     ALPRAZolam (XANAX) 0.25 MG tablet, Take 1 tablet (0.25 mg total) by mouth daily as needed for Anxiety., Disp: 30 tablet, Rfl: 2    butalbital-acetaminophen-caffeine -40 mg (FIORICET, ESGIC) -40 mg per tablet, Take 1 tablet by mouth every 6 (six) hours as needed for Headaches., Disp: 60 tablet, Rfl: 0    pantoprazole (PROTONIX) 40 MG tablet, Take 1 tablet (40 mg total) by mouth once daily., Disp: 90 tablet, Rfl: 1    spironolactone (ALDACTONE) 25 MG tablet, Take 1 tablet (25 mg total) by mouth once daily., Disp: 90 tablet, Rfl: 1    venlafaxine (EFFEXOR XR) 75 MG 24 hr capsule, Take 1 capsule (75 mg total) by mouth once daily., Disp: 90 capsule, Rfl: 0    methocarbamoL (ROBAXIN) 500 MG Tab, Take 1 tablet (500 mg total) by mouth 3 (three) times daily as needed (arm pain)., Disp: 30 tablet, Rfl: 0    methylPREDNISolone (MEDROL DOSEPACK) 4 mg tablet, use as directed, Disp: 21 each, Rfl: 0    Lab Results   Component Value Date    WBC 6.9 06/27/2023    HGB 13.4 06/27/2023    HCT 41.7 06/27/2023     06/27/2023    CHOL 206 (H) 06/27/2023    TRIG 121 06/27/2023    HDL 48 (L) 06/27/2023    ALT 10 06/27/2023    AST 13 06/27/2023     06/27/2023    K 4.2 06/27/2023     06/27/2023    CREATININE 0.91 06/27/2023    BUN 17 06/27/2023    CO2 26 06/27/2023    TSH 1.43 06/27/2023    HGBA1C 5.5 06/27/2023       Review of Systems   Constitutional: Negative.    Respiratory: Negative.     Cardiovascular: Negative.    Musculoskeletal:  Positive for arthralgias and myalgias.       Objective:      Physical Exam  Vitals reviewed.   Constitutional:        Appearance: Normal appearance.   Cardiovascular:      Rate and Rhythm: Normal rate and regular rhythm.      Pulses: Normal pulses.      Heart sounds: Normal heart sounds.   Pulmonary:      Effort: Pulmonary effort is normal.      Breath sounds: Normal breath sounds.   Musculoskeletal:      Left upper arm: Tenderness present. No swelling or edema.      Comments: No obvious cause of left upper arm pain.  Is tender with palpation in biceps area   Neurological:      Mental Status: She is alert.      Sensory: Sensation is intact.       Assessment:       1. Left upper arm pain        Plan:       Genet was seen today for arm pain.    Diagnoses and all orders for this visit:    Left upper arm pain  -     X-Ray Humerus 2 View Left; Future  -     methylPREDNISolone (MEDROL DOSEPACK) 4 mg tablet; use as directed  -     methocarbamoL (ROBAXIN) 500 MG Tab; Take 1 tablet (500 mg total) by mouth 3 (three) times daily as needed (arm pain).    X-ray of left humerus today.  Patient responds to NSAIDs so we will try Medrol Dosepak.  We will also try Robaxin as pain is located in biceps area.  Follow-up dependent upon results of x-ray.  We will make further recommendations once results received.  Consider referral to physical therapy or orthopedics.

## 2024-02-27 RX ORDER — AZITHROMYCIN 250 MG/1
TABLET, FILM COATED ORAL
Qty: 6 TABLET | Refills: 0 | Status: SHIPPED | OUTPATIENT
Start: 2024-02-27 | End: 2024-03-03

## 2024-04-09 LAB — PAP RECOMMENDATION EXT: NORMAL

## 2024-04-22 ENCOUNTER — PATIENT MESSAGE (OUTPATIENT)
Dept: FAMILY MEDICINE | Facility: CLINIC | Age: 49
End: 2024-04-22
Payer: COMMERCIAL

## 2024-05-01 ENCOUNTER — PATIENT MESSAGE (OUTPATIENT)
Dept: FAMILY MEDICINE | Facility: CLINIC | Age: 49
End: 2024-05-01
Payer: COMMERCIAL

## 2024-05-01 RX ORDER — VENLAFAXINE HYDROCHLORIDE 75 MG/1
75 CAPSULE, EXTENDED RELEASE ORAL DAILY
Qty: 90 CAPSULE | Refills: 0 | Status: SHIPPED | OUTPATIENT
Start: 2024-05-01 | End: 2024-05-27 | Stop reason: SDUPTHER

## 2024-05-01 NOTE — TELEPHONE ENCOUNTER
No care due was identified.  Health [facility] Embedded Care Due Messages. Reference number: [id number].   5/01/2024 11:11:15 AM CDT

## 2024-05-18 RX ORDER — AZITHROMYCIN 250 MG/1
TABLET, FILM COATED ORAL
Qty: 6 TABLET | Refills: 0 | Status: SHIPPED | OUTPATIENT
Start: 2024-05-18 | End: 2024-05-23

## 2024-05-18 RX ORDER — PREDNISONE 20 MG/1
40 TABLET ORAL DAILY
Qty: 10 TABLET | Refills: 0 | Status: SHIPPED | OUTPATIENT
Start: 2024-05-18 | End: 2024-05-23

## 2024-05-27 ENCOUNTER — PATIENT MESSAGE (OUTPATIENT)
Dept: FAMILY MEDICINE | Facility: CLINIC | Age: 49
End: 2024-05-27

## 2024-05-27 ENCOUNTER — OFFICE VISIT (OUTPATIENT)
Dept: FAMILY MEDICINE | Facility: CLINIC | Age: 49
End: 2024-05-27
Payer: COMMERCIAL

## 2024-05-27 VITALS
DIASTOLIC BLOOD PRESSURE: 70 MMHG | SYSTOLIC BLOOD PRESSURE: 114 MMHG | OXYGEN SATURATION: 98 % | BODY MASS INDEX: 31.18 KG/M2 | HEART RATE: 88 BPM | TEMPERATURE: 99 F | WEIGHT: 176 LBS | HEIGHT: 63 IN

## 2024-05-27 DIAGNOSIS — F41.9 ANXIETY: ICD-10-CM

## 2024-05-27 DIAGNOSIS — K21.9 GASTROESOPHAGEAL REFLUX DISEASE, UNSPECIFIED WHETHER ESOPHAGITIS PRESENT: ICD-10-CM

## 2024-05-27 DIAGNOSIS — H66.92 LEFT OTITIS MEDIA, UNSPECIFIED OTITIS MEDIA TYPE: ICD-10-CM

## 2024-05-27 DIAGNOSIS — G43.109 MIGRAINE WITH AURA AND WITHOUT STATUS MIGRAINOSUS, NOT INTRACTABLE: Primary | ICD-10-CM

## 2024-05-27 PROCEDURE — 99999 PR PBB SHADOW E&M-EST. PATIENT-LVL III: CPT | Mod: PBBFAC,,, | Performed by: FAMILY MEDICINE

## 2024-05-27 PROCEDURE — 99214 OFFICE O/P EST MOD 30 MIN: CPT | Mod: S$GLB,,, | Performed by: FAMILY MEDICINE

## 2024-05-27 RX ORDER — CEFUROXIME AXETIL 250 MG/1
250 TABLET ORAL 2 TIMES DAILY
COMMUNITY
End: 2024-05-27 | Stop reason: SDUPTHER

## 2024-05-27 RX ORDER — BUTALBITAL, ACETAMINOPHEN AND CAFFEINE 50; 325; 40 MG/1; MG/1; MG/1
1 TABLET ORAL EVERY 6 HOURS PRN
Qty: 60 TABLET | Refills: 0 | Status: CANCELLED | OUTPATIENT
Start: 2024-05-27

## 2024-05-27 RX ORDER — SPIRONOLACTONE 25 MG/1
25 TABLET ORAL DAILY
Qty: 90 TABLET | Refills: 1 | Status: SHIPPED | OUTPATIENT
Start: 2024-05-27

## 2024-05-27 RX ORDER — CEFUROXIME AXETIL 250 MG/1
250 TABLET ORAL 2 TIMES DAILY
Qty: 20 TABLET | Refills: 0 | Status: SHIPPED | OUTPATIENT
Start: 2024-05-27

## 2024-05-27 RX ORDER — VENLAFAXINE HYDROCHLORIDE 75 MG/1
75 CAPSULE, EXTENDED RELEASE ORAL DAILY
Qty: 90 CAPSULE | Refills: 1 | Status: SHIPPED | OUTPATIENT
Start: 2024-05-27

## 2024-05-27 RX ORDER — ALPRAZOLAM 0.25 MG/1
0.25 TABLET ORAL DAILY PRN
Qty: 30 TABLET | Refills: 2 | Status: SHIPPED | OUTPATIENT
Start: 2024-05-27

## 2024-05-27 RX ORDER — BUTALBITAL, ACETAMINOPHEN AND CAFFEINE 50; 325; 40 MG/1; MG/1; MG/1
1 TABLET ORAL EVERY 6 HOURS PRN
Qty: 28 TABLET | Refills: 0 | Status: SHIPPED | OUTPATIENT
Start: 2024-05-27

## 2024-05-27 RX ORDER — PANTOPRAZOLE SODIUM 40 MG/1
40 TABLET, DELAYED RELEASE ORAL DAILY
Qty: 90 TABLET | Refills: 1 | Status: SHIPPED | OUTPATIENT
Start: 2024-05-27

## 2024-05-27 NOTE — PROGRESS NOTES
SCRIBE #1 NOTE: I, Julisaanisa Neri, am scribing for, and in the presence of, Dav Patterson III, MD. I have scribed the entire note.     Subjective:       Patient ID: Genet Martinez is a 48 y.o. female.    Chief Complaint: Sore Throat (Started yesterday  ) and Ear Fullness (Ear blockage 1 week)    Genet Martinez is a 48 y.o. female who presents for scratchy throat since yesterday and ear fullness onset 1 week. Notes she went to Urgent Care two weeks ago for left ear fullness and clear liquid. Was prescribed prednisone and z-tank, also tried Claritin and Flonase which did not help. Denies fever or chills. Anxiety. On Xanax as needed and Effexor daily. Hx of migraines, notes headaches 2x a month, on Fioricet as needed. GERD, compliant with medication, no dysphagia. BMI of 31. Mammogram due next month. Colonoscopy is current, due June 2025.          Review of Systems   All other systems reviewed and are negative.  Negative for chills. Negative for fever.         Objective:      Physical examination: Vital signs noted. No acute distress. Left tympanic membrane retracted. Right tympanic membrane normal. No carotid bruit. Regular heart rate and rhythm. Lungs clear to auscultation bilaterally. Abdomen bowel sounds are positive soft and nontender. Extremities without edema. 2+ pedal pulses.      Assessment:       1. Migraine with aura and without status migrainosus, not intractable    2. Gastroesophageal reflux disease, unspecified whether esophagitis present    3. Left otitis media, unspecified otitis media type    4. Anxiety    5. BMI 31.0-31.9,adult        Plan:       Migraine with aura and without status migrainosus, not intractable  -     Lipid Panel; Future; Expected date: 11/27/2024  -     Comprehensive Metabolic Panel; Future; Expected date: 11/27/2024    Gastroesophageal reflux disease, unspecified whether esophagitis present  -     Lipid Panel; Future; Expected date: 11/27/2024    Left otitis media,  unspecified otitis media type  -     CBC Auto Differential; Future; Expected date: 11/27/2024    Anxiety    BMI 31.0-31.9,adult     She is able to take cephalosporins.  So will put her on Ceftin 250 b.I.d. for 10 days.  Mammogram in June.  Twenty-eight Fioricet 1 q.6 hours p.r.n. for migraine.  Xanax 0.5 30 pills with 2 refills.  One daily maximum p.r.n. for anxiety.  Continue her Effexor.    I, Dr Dav Patterson, personally performed the services described in this documentation. All medical record entries made by the scribe were at my direction and in my presence. I have reviewed the chart and agree that the record reflects my personal performance and is accurate and complete.

## 2024-07-15 ENCOUNTER — PATIENT MESSAGE (OUTPATIENT)
Dept: ADMINISTRATIVE | Facility: HOSPITAL | Age: 49
End: 2024-07-15
Payer: COMMERCIAL

## 2024-07-15 ENCOUNTER — PATIENT OUTREACH (OUTPATIENT)
Dept: ADMINISTRATIVE | Facility: HOSPITAL | Age: 49
End: 2024-07-15
Payer: COMMERCIAL

## 2024-07-15 DIAGNOSIS — Z12.31 OTHER SCREENING MAMMOGRAM: ICD-10-CM

## 2024-07-15 LAB
BCS RECOMMENDATION EXT: NORMAL
BCS RECOMMENDATION EXT: NORMAL

## 2024-07-15 NOTE — PROGRESS NOTES
Population Health Chart Review & Patient Outreach Details      Additional [facility] Notes:               Updates Requested / Reviewed:      Updated Care Coordination Note, Care Everywhere, , and External Sources: DIS         Health Maintenance Topics Overdue:      [facility] Score: 1     Mammogram                       Health Maintenance Topic(s) Outreach Outcomes & Actions Taken:    Breast Cancer Screening - Outreach Outcomes & Actions Taken  : Mammogram Order Placed and will ck DIS in a month

## 2024-07-22 ENCOUNTER — PATIENT OUTREACH (OUTPATIENT)
Dept: ADMINISTRATIVE | Facility: HOSPITAL | Age: 49
End: 2024-07-22
Payer: COMMERCIAL

## 2024-07-22 NOTE — PROGRESS NOTES
Population Health Chart Review & Patient Outreach Details      Additional [facility] Notes:               Updates Requested / Reviewed:      Updated Care Coordination Note, Care Everywhere, , and External Sources: [facility], [facility], and DIS         Health Maintenance Topics Overdue:      VB[facility] Score: 0     Patient is not due for any topics at this time.                       Health Maintenance Topic(s) Outreach Outcomes & Actions Taken:    Cervical Cancer Screening - Outreach Outcomes & Actions Taken  : External Records Uploaded & Care Team Updated if Applicable    Breast Cancer Screening - Outreach Outcomes & Actions Taken  : External Records Uploaded & Care Team Updated if Applicable

## 2024-08-28 ENCOUNTER — PATIENT OUTREACH (OUTPATIENT)
Dept: ADMINISTRATIVE | Facility: HOSPITAL | Age: 49
End: 2024-08-28
Payer: COMMERCIAL

## 2024-08-28 NOTE — PROGRESS NOTES
Population Health Chart Review & Patient Outreach Details      Additional [facility] Notes:               Updates Requested / Reviewed:      Updated Care Coordination Note, Care Everywhere, , and External Sources: DIS         Health Maintenance Topics Overdue:      VBHM Score: 0     Patient is not due for any topics at this time.                       Health Maintenance Topic(s) Outreach Outcomes & Actions Taken:    Breast Cancer Screening - Outreach Outcomes & Actions Taken  : External Records Uploaded & Care Team Updated if Applicable

## 2024-10-13 RX ORDER — PANTOPRAZOLE SODIUM 40 MG/1
40 TABLET, DELAYED RELEASE ORAL DAILY
Qty: 90 TABLET | Refills: 2 | Status: SHIPPED | OUTPATIENT
Start: 2024-10-13

## 2024-10-13 NOTE — TELEPHONE ENCOUNTER
Care Due:                  Date            Visit Type   Department     Provider  --------------------------------------------------------------------------------                                MYCHART                              [facility]  Last Visit: 05-      FICE VISIT   [facility]  Leonardo                              EP -                              [facility]  Next Visit: 12-      CARE ([facility])   [facility]  Leonardo                                                            Last  Test          Frequency    Reason                     Performed    Due Date  --------------------------------------------------------------------------------    CMP.........  12 months..  spironolactone,            06- 06-                             venlafaxine..............    [employer] Embedded Care Due Messages. Reference number: [id number].   10/13/2024 8:03:56 AM CDT

## 2024-10-13 NOTE — TELEPHONE ENCOUNTER
Refill Routing Note   Medication(s) are not appropriate for processing by [facility] for the following reason(s):        Drug-drug interaction    [facility] action(s):  Defer   Requires labs : Yes      Medication Therapy Plan: CEFTIN      Appointments  past 12m or future 3m with PCP    Date Provider   Last Visit   5/27/2024 Dav Patterson III, MD   Next Visit   12/2/2024 Dav Patterson III, MD   ED visits in past 90 days: 0        Note composed:6:23 PM 10/13/2024

## 2024-11-26 ENCOUNTER — PATIENT MESSAGE (OUTPATIENT)
Dept: FAMILY MEDICINE | Facility: CLINIC | Age: 49
End: 2024-11-26
Payer: COMMERCIAL

## 2024-11-27 DIAGNOSIS — H66.92 LEFT OTITIS MEDIA, UNSPECIFIED OTITIS MEDIA TYPE: ICD-10-CM

## 2024-11-27 DIAGNOSIS — K21.9 GASTROESOPHAGEAL REFLUX DISEASE, UNSPECIFIED WHETHER ESOPHAGITIS PRESENT: ICD-10-CM

## 2024-11-27 DIAGNOSIS — G43.109 MIGRAINE WITH AURA AND WITHOUT STATUS MIGRAINOSUS, NOT INTRACTABLE: Primary | ICD-10-CM

## 2024-12-03 ENCOUNTER — LAB VISIT (OUTPATIENT)
Dept: LAB | Facility: HOSPITAL | Age: 49
End: 2024-12-03
Attending: FAMILY MEDICINE
Payer: COMMERCIAL

## 2024-12-03 DIAGNOSIS — K21.9 GASTROESOPHAGEAL REFLUX DISEASE, UNSPECIFIED WHETHER ESOPHAGITIS PRESENT: ICD-10-CM

## 2024-12-03 DIAGNOSIS — H66.92 LEFT OTITIS MEDIA, UNSPECIFIED OTITIS MEDIA TYPE: ICD-10-CM

## 2024-12-03 DIAGNOSIS — G43.109 MIGRAINE WITH AURA AND WITHOUT STATUS MIGRAINOSUS, NOT INTRACTABLE: ICD-10-CM

## 2024-12-03 LAB
ALBUMIN SERPL BCP-MCNC: 4.2 G/DL (ref 3.5–5.2)
ALP SERPL-CCNC: 67 U/L (ref 55–135)
ALT SERPL W/O P-5'-P-CCNC: 11 U/L (ref 10–44)
ANION GAP SERPL CALC-SCNC: 6 MMOL/L (ref 8–16)
AST SERPL-CCNC: 15 U/L (ref 10–40)
BASOPHILS # BLD AUTO: 0.04 K/UL (ref 0–0.2)
BASOPHILS NFR BLD: 0.7 % (ref 0–1.9)
BILIRUB SERPL-MCNC: 0.4 MG/DL (ref 0.1–1)
BUN SERPL-MCNC: 19 MG/DL (ref 6–20)
CALCIUM SERPL-MCNC: 9.1 MG/DL (ref 8.7–10.5)
CHLORIDE SERPL-SCNC: 105 MMOL/L (ref 95–110)
CHOLEST SERPL-MCNC: 220 MG/DL (ref 120–199)
CHOLEST/HDLC SERPL: 3.9 {RATIO} (ref 2–5)
CO2 SERPL-SCNC: 28 MMOL/L (ref 23–29)
CREAT SERPL-MCNC: 1 MG/DL (ref 0.5–1.4)
DIFFERENTIAL METHOD BLD: ABNORMAL
EOSINOPHIL # BLD AUTO: 0.2 K/UL (ref 0–0.5)
EOSINOPHIL NFR BLD: 2.8 % (ref 0–8)
ERYTHROCYTE [DISTWIDTH] IN BLOOD BY AUTOMATED COUNT: 14.8 % (ref 11.5–14.5)
EST. GFR  (NO RACE VARIABLE): >60 ML/MIN/1.73 M^2
GLUCOSE SERPL-MCNC: 102 MG/DL (ref 70–110)
HCT VFR BLD AUTO: 38.9 % (ref 37–48.5)
HDLC SERPL-MCNC: 56 MG/DL (ref 40–75)
HDLC SERPL: 25.5 % (ref 20–50)
HGB BLD-MCNC: 11.7 G/DL (ref 12–16)
IMM GRANULOCYTES # BLD AUTO: 0.01 K/UL (ref 0–0.04)
IMM GRANULOCYTES NFR BLD AUTO: 0.2 % (ref 0–0.5)
LDLC SERPL CALC-MCNC: 149.6 MG/DL (ref 63–159)
LYMPHOCYTES # BLD AUTO: 1.7 K/UL (ref 1–4.8)
LYMPHOCYTES NFR BLD: 28.8 % (ref 18–48)
MCH RBC QN AUTO: 25.3 PG (ref 27–31)
MCHC RBC AUTO-ENTMCNC: 30.1 G/DL (ref 32–36)
MCV RBC AUTO: 84 FL (ref 82–98)
MONOCYTES # BLD AUTO: 0.6 K/UL (ref 0.3–1)
MONOCYTES NFR BLD: 10.1 % (ref 4–15)
NEUTROPHILS # BLD AUTO: 3.3 K/UL (ref 1.8–7.7)
NEUTROPHILS NFR BLD: 57.4 % (ref 38–73)
NONHDLC SERPL-MCNC: 164 MG/DL
NRBC BLD-RTO: 0 /100 WBC
PLATELET # BLD AUTO: 312 K/UL (ref 150–450)
PMV BLD AUTO: 10.3 FL (ref 9.2–12.9)
POTASSIUM SERPL-SCNC: 4.4 MMOL/L (ref 3.5–5.1)
PROT SERPL-MCNC: 7.1 G/DL (ref 6–8.4)
RBC # BLD AUTO: 4.63 M/UL (ref 4–5.4)
SODIUM SERPL-SCNC: 139 MMOL/L (ref 136–145)
TRIGL SERPL-MCNC: 72 MG/DL (ref 30–150)
WBC # BLD AUTO: 5.76 K/UL (ref 3.9–12.7)

## 2024-12-03 PROCEDURE — 80061 LIPID PANEL: CPT | Performed by: FAMILY MEDICINE

## 2024-12-03 PROCEDURE — 85025 COMPLETE CBC W/AUTO DIFF WBC: CPT | Performed by: FAMILY MEDICINE

## 2024-12-03 PROCEDURE — 80053 COMPREHEN METABOLIC PANEL: CPT | Performed by: FAMILY MEDICINE

## 2024-12-03 PROCEDURE — 36415 COLL VENOUS BLD VENIPUNCTURE: CPT | Performed by: FAMILY MEDICINE

## 2024-12-10 ENCOUNTER — OFFICE VISIT (OUTPATIENT)
Dept: FAMILY MEDICINE | Facility: CLINIC | Age: 49
End: 2024-12-10
Payer: COMMERCIAL

## 2024-12-10 ENCOUNTER — LAB VISIT (OUTPATIENT)
Dept: LAB | Facility: HOSPITAL | Age: 49
End: 2024-12-10
Attending: FAMILY MEDICINE
Payer: COMMERCIAL

## 2024-12-10 VITALS
WEIGHT: 176.25 LBS | OXYGEN SATURATION: 98 % | BODY MASS INDEX: 31.22 KG/M2 | DIASTOLIC BLOOD PRESSURE: 78 MMHG | HEART RATE: 85 BPM | TEMPERATURE: 98 F | SYSTOLIC BLOOD PRESSURE: 120 MMHG

## 2024-12-10 DIAGNOSIS — D64.9 ANEMIA, UNSPECIFIED TYPE: ICD-10-CM

## 2024-12-10 DIAGNOSIS — M79.652 LEFT THIGH PAIN: ICD-10-CM

## 2024-12-10 DIAGNOSIS — F41.9 ANXIETY: ICD-10-CM

## 2024-12-10 DIAGNOSIS — K21.9 GASTROESOPHAGEAL REFLUX DISEASE, UNSPECIFIED WHETHER ESOPHAGITIS PRESENT: ICD-10-CM

## 2024-12-10 DIAGNOSIS — E78.5 HYPERLIPIDEMIA, UNSPECIFIED HYPERLIPIDEMIA TYPE: Primary | ICD-10-CM

## 2024-12-10 DIAGNOSIS — L70.9 ACNE, UNSPECIFIED ACNE TYPE: ICD-10-CM

## 2024-12-10 DIAGNOSIS — G43.109 MIGRAINE WITH AURA AND WITHOUT STATUS MIGRAINOSUS, NOT INTRACTABLE: ICD-10-CM

## 2024-12-10 LAB
FERRITIN SERPL-MCNC: 4.6 NG/ML (ref 20–300)
FOLATE SERPL-MCNC: >22.3 NG/ML (ref 4–24)
IRON SERPL-MCNC: 35 UG/DL (ref 30–160)
SATURATED IRON: 7 % (ref 20–50)
TOTAL IRON BINDING CAPACITY: 521 UG/DL (ref 250–450)
TRANSFERRIN SERPL-MCNC: 372 MG/DL (ref 200–375)
VIT B12 SERPL-MCNC: 353 PG/ML (ref 210–950)

## 2024-12-10 PROCEDURE — 84466 ASSAY OF TRANSFERRIN: CPT | Performed by: FAMILY MEDICINE

## 2024-12-10 PROCEDURE — 99999 PR PBB SHADOW E&M-EST. PATIENT-LVL III: CPT | Mod: PBBFAC,,, | Performed by: FAMILY MEDICINE

## 2024-12-10 PROCEDURE — 82746 ASSAY OF FOLIC ACID SERUM: CPT | Performed by: FAMILY MEDICINE

## 2024-12-10 PROCEDURE — 99214 OFFICE O/P EST MOD 30 MIN: CPT | Mod: S$GLB,,, | Performed by: FAMILY MEDICINE

## 2024-12-10 PROCEDURE — 82728 ASSAY OF FERRITIN: CPT | Performed by: FAMILY MEDICINE

## 2024-12-10 PROCEDURE — 82607 VITAMIN B-12: CPT | Performed by: FAMILY MEDICINE

## 2024-12-10 PROCEDURE — 36415 COLL VENOUS BLD VENIPUNCTURE: CPT | Performed by: FAMILY MEDICINE

## 2024-12-10 RX ORDER — VENLAFAXINE HYDROCHLORIDE 75 MG/1
75 CAPSULE, EXTENDED RELEASE ORAL DAILY
Qty: 90 CAPSULE | Refills: 1 | Status: SHIPPED | OUTPATIENT
Start: 2024-12-10

## 2024-12-10 RX ORDER — PANTOPRAZOLE SODIUM 40 MG/1
40 TABLET, DELAYED RELEASE ORAL DAILY
Qty: 90 TABLET | Refills: 1 | Status: SHIPPED | OUTPATIENT
Start: 2024-12-10

## 2024-12-10 RX ORDER — SPIRONOLACTONE 25 MG/1
25 TABLET ORAL DAILY
Qty: 90 TABLET | Refills: 1 | Status: SHIPPED | OUTPATIENT
Start: 2024-12-10

## 2024-12-10 RX ORDER — BUTALBITAL, ACETAMINOPHEN AND CAFFEINE 50; 325; 40 MG/1; MG/1; MG/1
1 TABLET ORAL EVERY 6 HOURS PRN
Qty: 28 TABLET | Refills: 0 | Status: SHIPPED | OUTPATIENT
Start: 2024-12-10

## 2024-12-10 NOTE — PROGRESS NOTES
"SCRIBE #1 NOTE: I, Chaim Horvath, am scribing for, and in the presence of, Dav Patterson III, MD. I have scribed the entire note.     Subjective:       Patient ID: Genet Martinez is a 49 y.o. female.    Chief Complaint: Follow-up    Genet Martinez is a 49 y.o. female who presents for medication refills. Ms. Cardozo was last seen 5/27/24 for a sore throat and ear fullness. Her diet consists of an increased water intake. Acne. On Aldactone for 5 years now. The patient is currently complaining of lateral left thigh pain for 4-5 days and heavy cycles. Her thigh does not hurt when sleeping on it. Migraines doing well, occur a few times a month. On Butalbital. Anxiety. On Effexor. This medication induces "crazy dreams". On Xanax p.r.n. Slight anemia. Cardiovascular good. No chest pain. No palpitations. HLD. Cholesterol is 220. HDL is 56. LDL is 150. BMI is 31.22. GERD. Dysphagia, not doing well. Scope to be conducted by Dr. Cindy cummings. On Protonix. Influenza vaccine up to date. Mammogram and pap smear up to date. Colonoscopy due soon. All other care gaps discussed.             Review of Systems   Constitutional:  Negative for chills and fever.   HENT:  Positive for trouble swallowing. Negative for congestion and sore throat.    Eyes:  Negative for visual disturbance.   Respiratory:  Negative for chest tightness and shortness of breath.    Cardiovascular:  Negative for chest pain and palpitations.   Gastrointestinal:  Negative for nausea.   Endocrine: Negative for polydipsia and polyuria.   Genitourinary:  Negative for dysuria and flank pain.   Musculoskeletal:  Positive for myalgias. Negative for back pain, neck pain and neck stiffness.   Skin:  Negative for rash.   Neurological:  Negative for weakness.        Abnormal dreaming   Hematological:  Does not bruise/bleed easily.   Psychiatric/Behavioral:  Negative for behavioral problems.    All other systems reviewed and are negative.      Objective:    "   Physical examination: Vital signs noted. No acute distress. No carotid bruit. Regular heart rate and rhythm. Lungs clear to auscultation bilaterally. Abdomen bowel sounds are positive soft and nontender. Extremities without edema. 2+ pedal pulses. Left lateral thigh slightly tender.      Assessment:       1. Hyperlipidemia, unspecified hyperlipidemia type    2. Migraine with aura and without status migrainosus, not intractable    3. Gastroesophageal reflux disease, unspecified whether esophagitis present    4. Anxiety    5. Anemia, unspecified type    6. Acne, unspecified acne type    7. Left thigh pain        Plan:       Hyperlipidemia, unspecified hyperlipidemia type  -     Lipid Panel; Future; Expected date: 06/10/2025    Migraine with aura and without status migrainosus, not intractable  -     butalbital-acetaminophen-caffeine -40 mg (FIORICET, ESGIC) -40 mg per tablet; Take 1 tablet by mouth every 6 (six) hours as needed for Headaches.  Dispense: 28 tablet; Refill: 0    Gastroesophageal reflux disease, unspecified whether esophagitis present    Anxiety    Anemia, unspecified type  -     Iron and TIBC; Future; Expected date: 12/10/2024  -     Ferritin; Future; Expected date: 12/10/2024  -     Vitamin B12; Future; Expected date: 12/10/2024  -     Folate; Future; Expected date: 12/10/2024    Acne, unspecified acne type    Left thigh pain    Other orders  -     pantoprazole (PROTONIX) 40 MG tablet; Take 1 tablet (40 mg total) by mouth once daily.  Dispense: 90 tablet; Refill: 1  -     spironolactone (ALDACTONE) 25 MG tablet; Take 1 tablet (25 mg total) by mouth once daily.  Dispense: 90 tablet; Refill: 1  -     venlafaxine (EFFEXOR XR) 75 MG 24 hr capsule; Take 1 capsule (75 mg total) by mouth once daily.  Dispense: 90 capsule; Refill: 1    Refilled medications.  Flu shot at work.  Low-fat diet discussed.  Iron TIBC B12 folic acid ferritin ordered.  Check lipids in 6 months.  Continue current  medications.  EGD as planned.    I, Dr Dav Patterson, personally performed the services described in this documentation. All medical record entries made by the scribe were at my direction and in my presence. I have reviewed the chart and agree that the record reflects my personal performance and is accurate and complete.

## 2024-12-23 ENCOUNTER — TELEPHONE (OUTPATIENT)
Dept: FAMILY MEDICINE | Facility: CLINIC | Age: 49
End: 2024-12-23
Payer: COMMERCIAL

## 2024-12-23 NOTE — TELEPHONE ENCOUNTER
----- Message from Dav Patterson MD sent at 12/10/2024  9:43 PM CST -----  ABNORMAL iron and B12 deficiency.  Fergon 325 mg daily.  Vitamin B12 5000 sublingually daily.  GI evaluation.

## 2025-01-06 ENCOUNTER — ANESTHESIA (OUTPATIENT)
Dept: SURGERY | Facility: HOSPITAL | Age: 50
End: 2025-01-06
Payer: COMMERCIAL

## 2025-01-06 ENCOUNTER — ANESTHESIA EVENT (OUTPATIENT)
Dept: SURGERY | Facility: HOSPITAL | Age: 50
End: 2025-01-06
Payer: COMMERCIAL

## 2025-01-06 ENCOUNTER — HOSPITAL ENCOUNTER (OUTPATIENT)
Facility: HOSPITAL | Age: 50
Discharge: HOME | End: 2025-01-06
Attending: INTERNAL MEDICINE | Admitting: INTERNAL MEDICINE
Payer: COMMERCIAL

## 2025-01-06 VITALS
HEART RATE: 71 BPM | SYSTOLIC BLOOD PRESSURE: 162 MMHG | SYSTOLIC BLOOD PRESSURE: 117 MMHG | BODY MASS INDEX: 30.12 KG/M2 | OXYGEN SATURATION: 99 % | OXYGEN SATURATION: 99 % | DIASTOLIC BLOOD PRESSURE: 63 MMHG | RESPIRATION RATE: 16 BRPM | WEIGHT: 170 LBS | RESPIRATION RATE: 15 BRPM | HEIGHT: 63 IN | TEMPERATURE: 98 F | HEART RATE: 82 BPM | DIASTOLIC BLOOD PRESSURE: 86 MMHG

## 2025-01-06 DIAGNOSIS — K21.9 GASTROESOPHAGEAL REFLUX: ICD-10-CM

## 2025-01-06 PROCEDURE — 37000009 HC ANESTHESIA EA ADD 15 MINS: Performed by: INTERNAL MEDICINE

## 2025-01-06 PROCEDURE — 43239 EGD BIOPSY SINGLE/MULTIPLE: CPT | Mod: 59 | Performed by: INTERNAL MEDICINE

## 2025-01-06 PROCEDURE — 63600175 PHARM REV CODE 636 W HCPCS

## 2025-01-06 PROCEDURE — D9220A PRA ANESTHESIA: Mod: CRNA,,,

## 2025-01-06 PROCEDURE — 27201114 HC TRAP (ANY): Performed by: INTERNAL MEDICINE

## 2025-01-06 PROCEDURE — D9220A PRA ANESTHESIA: Mod: ANES,,, | Performed by: ANESTHESIOLOGY

## 2025-01-06 PROCEDURE — 43251 EGD REMOVE LESION SNARE: CPT | Performed by: INTERNAL MEDICINE

## 2025-01-06 PROCEDURE — 37000008 HC ANESTHESIA 1ST 15 MINUTES: Performed by: INTERNAL MEDICINE

## 2025-01-06 PROCEDURE — 27201042 HC RETRIEVAL NET: Performed by: INTERNAL MEDICINE

## 2025-01-06 PROCEDURE — 25000003 PHARM REV CODE 250

## 2025-01-06 PROCEDURE — 27200043 HC FORCEPS, BIOPSY: Performed by: INTERNAL MEDICINE

## 2025-01-06 PROCEDURE — 27201089 HC SNARE, DISP (ANY): Performed by: INTERNAL MEDICINE

## 2025-01-06 RX ORDER — PROPOFOL 10 MG/ML
VIAL (ML) INTRAVENOUS
Status: DISCONTINUED | OUTPATIENT
Start: 2025-01-06 | End: 2025-01-06

## 2025-01-06 RX ORDER — SODIUM CHLORIDE 9 MG/ML
INJECTION, SOLUTION INTRAVENOUS CONTINUOUS PRN
Status: DISCONTINUED | OUTPATIENT
Start: 2025-01-06 | End: 2025-01-06

## 2025-01-06 RX ADMIN — PROPOFOL 50 MG: 10 INJECTION, EMULSION INTRAVENOUS at 10:01

## 2025-01-06 RX ADMIN — SODIUM CHLORIDE: 0.9 INJECTION, SOLUTION INTRAVENOUS at 10:01

## 2025-01-06 NOTE — ANESTHESIA POSTPROCEDURE EVALUATION
Anesthesia Post Evaluation    Patient: Genet Martinez    Procedure(s) Performed: Procedure(s) (LRB):  EGD (ESOPHAGOGASTRODUODENOSCOPY) (N/A)    Final Anesthesia Type: general      Patient location during evaluation: GI PACU  Patient participation: Yes- Able to Participate  Level of consciousness: awake and alert and oriented  Post-procedure vital signs: reviewed and stable  Pain management: adequate  Airway patency: patent    PONV status at discharge: No PONV  Anesthetic complications: no      Cardiovascular status: blood pressure returned to baseline  Respiratory status: unassisted, spontaneous ventilation and room air  Hydration status: euvolemic  Follow-up not needed.              Vitals Value Taken Time   /57 01/06/25 1110   Temp 36.5 °C (97.7 °F) 01/06/25 1103   Pulse 79 01/06/25 1114   Resp 14 01/06/25 1116   SpO2 99 % 01/06/25 1114   Vitals shown include unfiled device data.      No case tracking events are documented in the log.      Pain/Kavita Score: No data recorded

## 2025-01-06 NOTE — PROVATION PATIENT INSTRUCTIONS
Discharge Summary/Instructions after an Endoscopic Procedure  Patient Name: Genet Martinez  Patient MRN: [id number]  Patient YOB: 1975 Monday, January 6, 2025  Brandyn White III, MD  RESTRICTIONS:  During your procedure today, you received medications for sedation.  These   medications may affect your judgment, balance and coordination.  Therefore,   for 24 hours, you have the following restrictions:   - DO NOT drive a car, operate machinery, make legal/financial decisions,   sign important papers or drink alcohol.    ACTIVITY:  Today: no heavy lifting, straining or running due to procedural   sedation/anesthesia.  The following day: return to full activity including work.  DIET:  Eat and drink normally unless instructed otherwise.     TREATMENT FOR COMMON SIDE EFFECTS:  - Mild abdominal pain, nausea, belching, bloating or excessive gas:  rest,   eat lightly and use a heating pad.  - Sore Throat: treat with throat lozenges and/or gargle with warm salt   water.  - Because air was used during the procedure, expelling large amounts of air   from your rectum or belching is normal.  - If a bowel prep was taken, you may not have a bowel movement for 1-3 days.    This is normal.  SYMPTOMS TO WATCH FOR AND REPORT TO YOUR PHYSICIAN:  1. Abdominal pain or bloating, other than gas cramps.  2. Chest pain.  3. Back pain.  4. Signs of infection such as: chills or fever occurring within 24 hours   after the procedure.  5. Rectal bleeding, which would show as bright red, maroon, or black stools.   (A tablespoon of blood from the rectum is not serious, especially if   hemorrhoids are present.)  6. Vomiting.  7. Weakness or dizziness.  GO DIRECTLY TO THE NEAREST EMERGENCY ROOM IF YOU HAVE ANY OF THE FOLLOWING:      Difficulty breathing              Chills and/or fever over 101 F   Persistent vomiting and/or vomiting blood   Severe abdominal pain   Severe chest pain   Black, tarry stools   Bleeding- more than one  tablespoon   Any other symptom or condition that you feel may need urgent attention  Your doctor recommends these additional instructions:  If any biopsies were taken, your doctors clinic will contact you in 1 to 2   weeks with any results.  - Patient has a contact number available for emergencies.  The signs and   symptoms of potential delayed complications were discussed with the   patient.  Return to normal activities tomorrow.  Written discharge   instructions were provided to the patient.   - Discharge patient to home (with escort).   - Await pathology results.  For questions, problems or results please call your physician - Brandyn Whiet III, MD at Work:  [phone].  [facility], EMERGENCY ROOM PHONE NUMBER: [phone]  IF A COMPLICATION OR EMERGENCY SITUATION ARISES AND YOU ARE UNABLE TO REACH   YOUR PHYSICIAN - GO DIRECTLY TO THE EMERGENCY ROOM.  Brandyn White III, MD  1/6/2025 10:57:42 AM  This report has been verified and signed electronically.  Dear patient,  As a result of recent federal legislation (The Federal Cures Act), you may   receive lab or pathology results from your procedure in your [facility]   account before your physician is able to contact you. Your physician or   their representative will relay the results to you with their   recommendations at their soonest availability.  Thank you,  PROVATION

## 2025-01-06 NOTE — H&P
GASTROENTEROLOGY PRE-PROCEDURE H&P NOTE  Patient Name: Genet Martinez  Patient MRN: [id number]  Patient : 1975    Service date: 2025    PCP: Dav Patterson III, MD    No chief complaint on file.      HPI: Patient is a 49 y.o. female with PMHx as below here for evaluation of PPI resistant GERD.     Past Medical History:  Past Medical History:   Diagnosis Date    Anxiety disorder, unspecified     Bilateral patent pressure equalization (PE) tubes     as a child    GERD (gastroesophageal reflux disease)     Migraines         Past Surgical History:  Past Surgical History:   Procedure Laterality Date     SECTION      RECONSTRUCTION OF POSTERIOR TIBIAL TENDON WITH SURGICAL REMOVAL OF ACCESSORY NAVICULAR BONE OF FOOT Left 2021    Procedure: TENDON RECONSTRUCTION, POSTERIOR TIBIAL, WITH ACCESSORY NAVICULAR BONE EXCISION;  Surgeon: Mario Alberto Devi MD;  Location: [facility] OR;  Service: Orthopedics;  Laterality: Left;    REPAIR OF TENDON OF LOWER EXTREMITY Left 2021    Procedure: REPAIR, TENDON, LOWER EXTREMITY;  Surgeon: Mario Alberto Devi MD;  Location: [facility] OR;  Service: Orthopedics;  Laterality: Left;    TUBAL LIGATION      TYMPANOSTOMY TUBE PLACEMENT      WISDOM TOOTH EXTRACTION          Home Medications:  Medications Prior to Admission   Medication Sig Dispense Refill Last Dose/Taking    ALPRAZolam (XANAX) 0.25 MG tablet Take 1 tablet (0.25 mg total) by mouth daily as needed for Anxiety. 30 tablet 2     butalbital-acetaminophen-caffeine -40 mg (FIORICET, ESGIC) -40 mg per tablet Take 1 tablet by mouth every 6 (six) hours as needed for Headaches. 28 tablet 0     pantoprazole (PROTONIX) 40 MG tablet Take 1 tablet (40 mg total) by mouth once daily. 90 tablet 1     spironolactone (ALDACTONE) 25 MG tablet Take 1 tablet (25 mg total) by mouth once daily. 90 tablet 1     venlafaxine (EFFEXOR XR) 75 MG 24 hr capsule Take 1 capsule (75 mg total) by mouth once daily. 90 capsule 1   "              Review of patient's allergies indicates:   Allergen Reactions    Penicillins Hives       Social History:   Social History     Occupational History    Not on file   Tobacco Use    Smoking status: Never    Smokeless tobacco: Never   Substance and Sexual Activity    Alcohol use: Not Currently    Drug use: No    Sexual activity: Yes     Partners: Male       Family History:   Family History   Problem Relation Name Age of Onset    Hypertension Father      Hyperlipidemia Father         Review of Systems:  A 10 point review of systems was performed and was normal, except as mentioned in the HPI, including constitutional, HEENT, heme, lymph, cardiovascular, respiratory, gastrointestinal, genitourinary, neurologic, endocrine, psychiatric and musculoskeletal.      OBJECTIVE:    Physical Exam:  24 Hour Vital Sign Ranges: Temp:  [97.8 °F (36.6 °C)] 97.8 °F (36.6 °C)  Pulse:  [78] 78  Resp:  [16] 16  SpO2:  [100 %] 100 %  BP: (153)/(82) 153/82  Most recent vitals: BP (!) 153/82 (BP Location: Left arm, Patient Position: Lying)   Pulse 78   Temp 97.8 °F (36.6 °C) (Temporal)   Resp 16   Ht 5' 3" (1.6 m)   Wt 77.1 kg (170 lb)   SpO2 100%   BMI 30.11 kg/m²    GEN: well-developed, well-nourished, awake and alert, non-toxic appearing adult  HEENT: PERRL, sclera anicteric, oral mucosa pink and moist without lesion  NECK: trachea midline; Good ROM  CV: regular rate and rhythm, no murmurs or gallops  RESP: clear to auscultation bilaterally, no wheezes, rhonci or rales  ABD: soft, non-tender, non-distended, normal bowel sounds  EXT: no swelling or edema, 2+ pulses distally  SKIN: no rashes or jaundice  PSYCH: normal affect    Labs:   No results for input(s): "WBC", "MCV", "PLT" in the last 72 hours.    Invalid input(s): "HGBAU"  No results for input(s): "NA", "K", "CL", "CO2", "BUN", "GLU" in the last 72 hours.    Invalid input(s): "CREA"  No results for input(s): "ALB" in the last 72 hours.    Invalid input(s): "ALKP", " ""SGOT", "SGPT", "TBIL", "DBIL", "TPRO"  No results for input(s): "PT", "INR", "PTT" in the last 72 hours.      IMPRESSION / RECOMMENDATIONS:  EGD  with interventions as warranted.   RIsks, benefits, alternatives discussed in detail regarding upcoming procedures and sedation. Some of the more common endoscopic complications include but not limited to immediate or delayed perforation, bleeding, infections, pain, inadvertent injury to surrounding tissue / organs and possible need for surgical evaluation. Patient expressed understanding, all questions answered and will proceed with procedure as planned.     Brandyn GILLIAM Munchkin III  1/6/2025  10:38 AM      "

## 2025-01-06 NOTE — ANESTHESIA PREPROCEDURE EVALUATION
2025  Genet Martinez is a 49 y.o., female.        Patient Active Problem List   Diagnosis    Anxiety    Gastroesophageal reflux disease    Non-smoker    Migraine with aura and without status migrainosus, not intractable    Hyperlipidemia    Pain associated with accessory navicular bone of left foot    Gait abnormality    Decreased range of motion of left ankle    Decreased strength of lower extremity       Past Surgical History:   Procedure Laterality Date     SECTION      RECONSTRUCTION OF POSTERIOR TIBIAL TENDON WITH SURGICAL REMOVAL OF ACCESSORY NAVICULAR BONE OF FOOT Left 2021    Procedure: TENDON RECONSTRUCTION, POSTERIOR TIBIAL, WITH ACCESSORY NAVICULAR BONE EXCISION;  Surgeon: Mario Alberto Devi MD;  Location: [facility] OR;  Service: Orthopedics;  Laterality: Left;    REPAIR OF TENDON OF LOWER EXTREMITY Left 2021    Procedure: REPAIR, TENDON, LOWER EXTREMITY;  Surgeon: Mario Alberto Devi MD;  Location: [facility] OR;  Service: Orthopedics;  Laterality: Left;    TUBAL LIGATION      TYMPANOSTOMY TUBE PLACEMENT      WISDOM TOOTH EXTRACTION          Tobacco Use:  The patient  reports that she has never smoked. She has never used smokeless tobacco.     No results found for this or any previous visit.          Lab Results   Component Value Date    WBC 5.76 2024    HGB 11.7 (L) 2024    HCT 38.9 2024    MCV 84 2024     2024     BMP  Lab Results   Component Value Date     2024    K 4.4 2024     2024    CO2 28 2024    BUN 19 2024    CREATININE 1.0 2024    CALCIUM 9.1 2024    ANIONGAP 6 (L) 2024     2024    GLU 99 2023    GLU 96 2022       No results found for this or any previous visit.            Pre-op Assessment    I have reviewed the Patient Summary Reports.     I have  reviewed the Nursing Notes. I have reviewed the NPO Status.   I have reviewed the Medications.     Review of Systems  Anesthesia Hx:  No problems with previous Anesthesia             Denies Family Hx of Anesthesia complications.    Denies Personal Hx of Anesthesia complications.                    Social:  Non-Smoker       Hematology/Oncology:  Hematology Normal                                     Cardiovascular:                hyperlipidemia                               Pulmonary:  Pulmonary Normal                       Hepatic/GI:     GERD, well controlled                Musculoskeletal:  Musculoskeletal Normal    Hx left foot pain, impaired gait            Neurological:      Headaches (hx migraines)                                 Endocrine:  Endocrine Normal            Psych:  Psychiatric History anxiety                 Physical Exam  General: Well nourished, Cooperative, Alert and Oriented    Airway:  Mallampati: III / II  Mouth Opening: Normal  TM Distance: Normal  Tongue: Normal  Neck ROM: Normal ROM    Dental:  Intact    Chest/Lungs:  Clear to auscultation    Heart:  Rate: Normal  Rhythm: Regular Rhythm  Sounds: Normal    Abdomen:  Normal, Soft, Nontender        Anesthesia Plan  Type of Anesthesia, risks & benefits discussed:    Anesthesia Type: Gen Natural Airway  Intra-op Monitoring Plan: Standard ASA Monitors  Post Op Pain Control Plan:   (medical reason for not using multimodal pain management)  Induction:  IV  Informed Consent: Informed consent signed with the Patient and all parties understand the risks and agree with anesthesia plan.  All questions answered. Patient consented to blood products? No  ASA Score: 2  Anesthesia Plan Notes:   General Natural Airway  Propofol  Zofran    Ready For Surgery From Anesthesia Perspective.     .

## 2025-01-06 NOTE — DISCHARGE INSTRUCTIONS
No driving for 24 hours  No alcohol for 24 hours  Do not make any critical decisions or sign legal documents until tomorrow.  Doctor Cindy will call you with results of pathology in about 1 week.

## 2025-01-06 NOTE — TRANSFER OF CARE
"Anesthesia Transfer of Care Note    Patient: Genet Martinez    Procedure(s) Performed: Procedure(s) (LRB):  EGD (ESOPHAGOGASTRODUODENOSCOPY) (N/A)    Patient location: GI    Anesthesia Type: general    Transport from OR: Transported from OR on 100% O2 by closed face mask with adequate spontaneous ventilation    Post pain: adequate analgesia    Post assessment: no apparent anesthetic complications and tolerated procedure well    Post vital signs: stable    Level of consciousness: responds to stimulation    Nausea/Vomiting: no nausea/vomiting    Complications: none    Transfer of care protocol was followed      Last vitals: Visit Vitals  BP (!) 153/82 (BP Location: Left arm, Patient Position: Lying)   Pulse 78   Temp 36.6 °C (97.8 °F) (Temporal)   Resp 16   Ht 5' 3" (1.6 m)   Wt 77.1 kg (170 lb)   SpO2 100%   BMI 30.11 kg/m²     "

## 2025-01-20 ENCOUNTER — OFFICE VISIT (OUTPATIENT)
Dept: URGENT CARE | Facility: CLINIC | Age: 50
End: 2025-01-20
Payer: COMMERCIAL

## 2025-01-20 VITALS
DIASTOLIC BLOOD PRESSURE: 94 MMHG | HEIGHT: 63 IN | SYSTOLIC BLOOD PRESSURE: 164 MMHG | WEIGHT: 170 LBS | BODY MASS INDEX: 30.12 KG/M2 | TEMPERATURE: 98 F | OXYGEN SATURATION: 98 % | RESPIRATION RATE: 16 BRPM | HEART RATE: 85 BPM

## 2025-01-20 DIAGNOSIS — J01.40 ACUTE NON-RECURRENT PANSINUSITIS: ICD-10-CM

## 2025-01-20 DIAGNOSIS — R05.9 COUGH, UNSPECIFIED TYPE: Primary | ICD-10-CM

## 2025-01-20 LAB
CTP QC/QA: YES
CTP QC/QA: YES
FLUAV AG NPH QL: NEGATIVE
FLUBV AG NPH QL: NEGATIVE
SARS-COV-2 AG RESP QL IA.RAPID: NEGATIVE

## 2025-01-20 PROCEDURE — 87804 INFLUENZA ASSAY W/OPTIC: CPT | Mod: QW,,, | Performed by: STUDENT IN AN ORGANIZED HEALTH CARE EDUCATION/TRAINING PROGRAM

## 2025-01-20 PROCEDURE — 99214 OFFICE O/P EST MOD 30 MIN: CPT | Mod: 25,S$GLB,, | Performed by: STUDENT IN AN ORGANIZED HEALTH CARE EDUCATION/TRAINING PROGRAM

## 2025-01-20 PROCEDURE — 96372 THER/PROPH/DIAG INJ SC/IM: CPT | Mod: S$GLB,,, | Performed by: STUDENT IN AN ORGANIZED HEALTH CARE EDUCATION/TRAINING PROGRAM

## 2025-01-20 PROCEDURE — 87811 SARS-COV-2 COVID19 W/OPTIC: CPT | Mod: QW,S$GLB,, | Performed by: STUDENT IN AN ORGANIZED HEALTH CARE EDUCATION/TRAINING PROGRAM

## 2025-01-20 RX ORDER — AZELASTINE HYDROCHLORIDE, FLUTICASONE PROPIONATE 137; 50 UG/1; UG/1
1 SPRAY, METERED NASAL 2 TIMES DAILY
Qty: 23 G | Refills: 0 | Status: SHIPPED | OUTPATIENT
Start: 2025-01-20

## 2025-01-20 RX ORDER — DEXAMETHASONE SODIUM PHOSPHATE 4 MG/ML
8 INJECTION, SOLUTION INTRA-ARTICULAR; INTRALESIONAL; INTRAMUSCULAR; INTRAVENOUS; SOFT TISSUE
Status: COMPLETED | OUTPATIENT
Start: 2025-01-20 | End: 2025-01-20

## 2025-01-20 RX ORDER — LORATADINE PSEUDOEPHEDRINE SULFATE 10; 240 MG/1; MG/1
1 TABLET, EXTENDED RELEASE ORAL DAILY
COMMUNITY
Start: 2025-01-20 | End: 2025-01-30

## 2025-01-20 RX ORDER — DOXYCYCLINE HYCLATE 100 MG
100 TABLET ORAL EVERY 12 HOURS
Qty: 20 TABLET | Refills: 0 | Status: SHIPPED | OUTPATIENT
Start: 2025-01-20 | End: 2025-01-30

## 2025-01-20 RX ADMIN — DEXAMETHASONE SODIUM PHOSPHATE 8 MG: 4 INJECTION, SOLUTION INTRA-ARTICULAR; INTRALESIONAL; INTRAMUSCULAR; INTRAVENOUS; SOFT TISSUE at 01:01

## 2025-01-20 NOTE — PROGRESS NOTES
"Subjective:      Patient ID: Genet Martinez is a 49 y.o. female.    Vitals:  height is 5' 3" (1.6 m) and weight is 77.1 kg (170 lb). Her oral temperature is 98.3 °F (36.8 °C). Her blood pressure is 164/94 (abnormal) and her pulse is 85. Her respiration is 16 and oxygen saturation is 98%.     Chief Complaint: Cough    Patient is a 49-year-old female with a past medical history anxiety, migraines, hyperlipidemia, and GERD who presents to clinic for evaluation of COVID like symptoms.  Patient reports symptoms x2 days now.  Patient reports that she has taken over-the-counter Flonase and Claritin with mild relief to symptoms.    Cough  This is a new problem. The current episode started in the past 7 days. Associated symptoms include nasal congestion and postnasal drip. Pertinent negatives include no chest pain, chills, ear pain, fever, headaches, rash, sore throat (Reports scratchy not painful) or shortness of breath. Associated symptoms comments: Sneezing, runny nose.       Constitution: Negative. Negative for chills, sweating, fatigue and fever.   HENT:  Positive for congestion, postnasal drip and sinus pressure. Negative for ear pain and sore throat (Reports scratchy not painful).    Neck: neck negative.   Cardiovascular: Negative.  Negative for chest pain and palpitations.   Eyes: Negative.    Respiratory:  Positive for cough. Negative for chest tightness, sputum production and shortness of breath.    Gastrointestinal: Negative.  Negative for abdominal pain, nausea, vomiting and diarrhea.   Endocrine: negative.   Genitourinary: Negative.  Negative for dysuria, frequency and urgency.   Musculoskeletal: Negative.    Skin: Negative.  Negative for color change, pale, rash and erythema.   Allergic/Immunologic: Negative.    Neurological: Negative.  Negative for dizziness, light-headedness, passing out, headaches, disorientation and altered mental status.   Hematologic/Lymphatic: Negative.    Psychiatric/Behavioral: " Negative.  Negative for altered mental status, disorientation and confusion.       Objective:     Physical Exam   Constitutional: She is oriented to person, place, and time. She appears well-developed. She is cooperative.  Non-toxic appearance. She does not appear ill. No distress.   HENT:   Head: Normocephalic and atraumatic.   Ears:   Right Ear: Hearing, tympanic membrane, external ear and ear canal normal.   Left Ear: Hearing, tympanic membrane, external ear and ear canal normal.   Nose: Congestion present. No mucosal edema, rhinorrhea or nasal deformity. No epistaxis. Right sinus exhibits maxillary sinus tenderness (Maxillary greater than frontal) and frontal sinus tenderness. Left sinus exhibits maxillary sinus tenderness and frontal sinus tenderness.   Mouth/Throat: Uvula is midline, oropharynx is clear and moist and mucous membranes are normal. Mucous membranes are moist. No trismus in the jaw. Normal dentition. No uvula swelling. No oropharyngeal exudate or posterior oropharyngeal erythema (Postnasal drainage to oropharynx). Oropharynx is clear.   Eyes: Conjunctivae and lids are normal. Pupils are equal, round, and reactive to light. Right eye exhibits no discharge. Left eye exhibits no discharge. No scleral icterus.   Neck: Trachea normal and phonation normal. Neck supple. No neck rigidity present.   Cardiovascular: Normal rate, regular rhythm, normal heart sounds and normal pulses.   Pulmonary/Chest: Effort normal and breath sounds normal. No respiratory distress. She has no wheezes. She has no rhonchi. She has no rales.   Abdominal: Normal appearance and bowel sounds are normal. She exhibits no distension. Soft. There is no abdominal tenderness.   Musculoskeletal: Normal range of motion.         General: Normal range of motion.      Cervical back: She exhibits no tenderness.   Lymphadenopathy:     She has no cervical adenopathy.   Neurological: She is alert and oriented to person, place, and time. She  exhibits normal muscle tone.   Skin: Skin is warm, dry, intact, not diaphoretic, not pale and no rash. Capillary refill takes less than 2 seconds. No erythema   Psychiatric: Her speech is normal and behavior is normal. Judgment and thought content normal.   Nursing note and vitals reviewed.      Assessment:     1. Cough, unspecified type    2. Acute non-recurrent pansinusitis        Plan:       Cough, unspecified type  -     SARS Coronavirus 2 Antigen, POCT Manual Read  -     POCT Influenza A/B Rapid Antigen    Acute non-recurrent pansinusitis    Other orders  -     doxycycline (VIBRA-TABS) 100 MG tablet; Take 1 tablet (100 mg total) by mouth every 12 (twelve) hours. for 10 days  Dispense: 20 tablet; Refill: 0  -     loratadine-pseudoephedrine  mg (CLARITIN-D 24 HOUR)  mg per 24 hr tablet; Take 1 tablet by mouth once daily. for 10 days  -     azelastine-fluticasone (DYMISTA) 137-50 mcg/spray Spry nassal spray; 1 spray by Each Nostril route 2 (two) times daily.  Dispense: 23 g; Refill: 0  -     pyrilamine-chlophedianoL 12.5-12.5 mg/5 mL Liqd; Take 5-10 mLs by mouth every 6 to 8 hours as needed (Cough).  Dispense: 118 mL; Refill: 0  -     dexAMETHasone injection 8 mg                Labs:  Influenza a and B negative.  COVID negative.  Decadron 8 mg IM in clinic.  Patient tolerated well.  No complications noted.  Take medications as prescribed.  Tylenol/Motrin per package instructions for any pain or fever.  Assure adequate hydration and rest.  Throat lozenges or Chloraseptic per package instructions for sore throat.    Warm salt water gargles every 2-3 hours as needed for sore throat.    Nasal saline flushes or irrigation as directed for nasal saline congestion and sinus related symptoms.  Follow-up with PCP in 1-2 days.  Return to clinic as needed.  To ED for any new or acutely worsening symptoms.  Patient in agreement with plan of care.    DISCLAIMER: Please note that my documentation in this Electronic  Healthcare Record was produced using speech recognition software and therefore may contain errors related to that software system.These could include grammar, punctuation and spelling errors or the inclusion/exclusion of phrases that were not intended. Garbled syntax, mangled pronouns, and other bizarre constructions may be attributed to that software system.   I want my hip to get better soon and go home

## 2025-03-02 ENCOUNTER — OFFICE VISIT (OUTPATIENT)
Dept: URGENT CARE | Facility: CLINIC | Age: 50
End: 2025-03-02
Payer: COMMERCIAL

## 2025-03-02 VITALS
DIASTOLIC BLOOD PRESSURE: 90 MMHG | SYSTOLIC BLOOD PRESSURE: 139 MMHG | OXYGEN SATURATION: 98 % | WEIGHT: 170 LBS | TEMPERATURE: 98 F | RESPIRATION RATE: 16 BRPM | HEIGHT: 63 IN | HEART RATE: 76 BPM | BODY MASS INDEX: 30.12 KG/M2

## 2025-03-02 DIAGNOSIS — J32.9 SINUSITIS, UNSPECIFIED CHRONICITY, UNSPECIFIED LOCATION: ICD-10-CM

## 2025-03-02 DIAGNOSIS — J02.9 SORETHROAT: Primary | ICD-10-CM

## 2025-03-02 DIAGNOSIS — R05.8 POST-VIRAL COUGH SYNDROME: ICD-10-CM

## 2025-03-02 DIAGNOSIS — R05.9 COUGH, UNSPECIFIED TYPE: ICD-10-CM

## 2025-03-02 LAB
CTP QC/QA: YES
FLUAV AG NPH QL: NEGATIVE
FLUBV AG NPH QL: NEGATIVE
S PYO RRNA THROAT QL PROBE: NEGATIVE
SARS CORONAVIRUS 2 ANTIGEN: NEGATIVE

## 2025-03-02 PROCEDURE — 87811 SARS-COV-2 COVID19 W/OPTIC: CPT | Mod: QW,S$GLB,, | Performed by: NURSE PRACTITIONER

## 2025-03-02 PROCEDURE — 99214 OFFICE O/P EST MOD 30 MIN: CPT | Mod: S$GLB,,, | Performed by: NURSE PRACTITIONER

## 2025-03-02 PROCEDURE — 87880 STREP A ASSAY W/OPTIC: CPT | Mod: QW,,, | Performed by: NURSE PRACTITIONER

## 2025-03-02 PROCEDURE — 87804 INFLUENZA ASSAY W/OPTIC: CPT | Mod: QW,,, | Performed by: NURSE PRACTITIONER

## 2025-03-02 RX ORDER — MONTELUKAST SODIUM 10 MG/1
10 TABLET ORAL NIGHTLY
Qty: 30 TABLET | Refills: 0 | Status: SHIPPED | OUTPATIENT
Start: 2025-03-02 | End: 2025-04-01

## 2025-03-02 NOTE — PROGRESS NOTES
"Subjective:      Patient ID: Genet Martinez is a 49 y.o. female.    Vitals:  height is 5' 3" (1.6 m) and weight is 77.1 kg (170 lb). Her oral temperature is 97.9 °F (36.6 °C). Her blood pressure is 139/90 (abnormal) and her pulse is 76. Her respiration is 16 and oxygen saturation is 98%.     Chief Complaint: Cough    Patient reports that she was here proximally 1 month ago with sinus infection and was treated with antibiotics and steroids.  Symptoms significantly improved however she has had this lingering cough and postnasal drip    Cough  Episode onset: x 3 days. The cough is Productive of sputum. Associated symptoms include postnasal drip and a sore throat. Pertinent negatives include no chest pain, fever, rash, shortness of breath or wheezing. Treatments tried: Tylenol 7am.       Constitution: Negative for appetite change, fatigue and fever.   HENT:  Positive for congestion (Yellow), postnasal drip and sore throat.    Cardiovascular:  Negative for chest pain.   Respiratory:  Positive for cough (Attributes to postnasal drip). Negative for chest tightness, shortness of breath, wheezing and asthma.    Skin:  Negative for rash.   Allergic/Immunologic: Negative for asthma.      Objective:     Physical Exam   Constitutional: She is oriented to person, place, and time. She is cooperative.  Non-toxic appearance. She does not appear ill. No distress. awake  HENT:   Head: Normocephalic and atraumatic.   Ears:   Right Ear: Tympanic membrane, external ear and ear canal normal.   Left Ear: Tympanic membrane, external ear and ear canal normal.   Nose: Congestion present. No rhinorrhea.   Mouth/Throat: Mucous membranes are moist. Posterior oropharyngeal erythema present. No oropharyngeal exudate.   Eyes: Conjunctivae are normal. Right eye exhibits no discharge. Left eye exhibits no discharge.   Cardiovascular: Normal rate, regular rhythm and normal heart sounds.   Pulmonary/Chest: Effort normal and breath sounds normal. No " accessory muscle usage. No tachypnea. No respiratory distress. She has no wheezes. She has no rhonchi. She has no rales. She exhibits no tenderness.   Abdominal: Normal appearance.   Neurological: no focal deficit. She is alert and oriented to person, place, and time. No sensory deficit.   Skin: Skin is warm, dry, not diaphoretic and no rash. Capillary refill takes 2 to 3 seconds.   Psychiatric: Her behavior is normal. Mood normal.   Nursing note and vitals reviewed.      Assessment:     1. Sorethroat    2. Cough, unspecified type    3. Sinusitis, unspecified chronicity, unspecified location    4. Post-viral cough syndrome      COVID negative  Flu a/B negative  Strep A negative      Plan:       Sorethroat  -     POCT rapid strep A    Cough, unspecified type  -     POCT Influenza A/B Rapid Antigen  -     SARS Coronavirus 2 Antigen, POCT Manual Read    Sinusitis, unspecified chronicity, unspecified location    Post-viral cough syndrome  -     montelukast (SINGULAIR) 10 mg tablet; Take 1 tablet (10 mg total) by mouth every evening.  Dispense: 30 tablet; Refill: 0

## 2025-03-02 NOTE — PATIENT INSTRUCTIONS
Continue Claritin or antihistamine of choice daily    Singulair as prescribed daily.  Please read the medication information sheet provided especially the section on possible adverse reactions in if you start to experience any of these adverse reaction stop this medication immediately    Return to clinic or seek medical re-evaluation if symptoms worsen or fail to improve in a reasonable bout of time

## 2025-06-09 ENCOUNTER — PATIENT MESSAGE (OUTPATIENT)
Dept: FAMILY MEDICINE | Facility: CLINIC | Age: 50
End: 2025-06-09
Payer: COMMERCIAL

## 2025-06-09 DIAGNOSIS — Z00.00 PHYSICAL EXAM: ICD-10-CM

## 2025-06-09 DIAGNOSIS — E78.5 HYPERLIPIDEMIA, UNSPECIFIED HYPERLIPIDEMIA TYPE: Primary | ICD-10-CM

## 2025-06-14 LAB
ALBUMIN SERPL-MCNC: 4.2 G/DL (ref 3.6–5.1)
ALBUMIN/GLOB SERPL: 1.7 (CALC) (ref 1–2.5)
ALP SERPL-CCNC: 75 U/L (ref 31–125)
ALT SERPL-CCNC: 11 U/L (ref 6–29)
AST SERPL-CCNC: 13 U/L (ref 10–35)
BASOPHILS # BLD AUTO: 33 CELLS/UL (ref 0–200)
BASOPHILS NFR BLD AUTO: 0.5 %
BILIRUB SERPL-MCNC: 0.4 MG/DL (ref 0.2–1.2)
BUN SERPL-MCNC: 13 MG/DL (ref 7–25)
BUN/CREAT SERPL: ABNORMAL (CALC) (ref 6–22)
CALCIUM SERPL-MCNC: 9.1 MG/DL (ref 8.6–10.2)
CHLORIDE SERPL-SCNC: 102 MMOL/L (ref 98–110)
CHOLEST SERPL-MCNC: 219 MG/DL
CHOLEST/HDLC SERPL: 4.6 (CALC)
CO2 SERPL-SCNC: 26 MMOL/L (ref 20–32)
CREAT SERPL-MCNC: 0.81 MG/DL (ref 0.5–0.99)
EGFR: 89 ML/MIN/1.73M2
EOSINOPHIL # BLD AUTO: 182 CELLS/UL (ref 15–500)
EOSINOPHIL NFR BLD AUTO: 2.8 %
ERYTHROCYTE [DISTWIDTH] IN BLOOD BY AUTOMATED COUNT: 13.3 % (ref 11–15)
GLOBULIN SER CALC-MCNC: 2.5 G/DL (CALC) (ref 1.9–3.7)
GLUCOSE SERPL-MCNC: 102 MG/DL (ref 65–99)
HCT VFR BLD AUTO: 43.1 % (ref 35–45)
HDLC SERPL-MCNC: 48 MG/DL
HGB BLD-MCNC: 13.6 G/DL (ref 11.7–15.5)
LDLC SERPL CALC-MCNC: 149 MG/DL (CALC)
LYMPHOCYTES # BLD AUTO: 1658 CELLS/UL (ref 850–3900)
LYMPHOCYTES NFR BLD AUTO: 25.5 %
MCH RBC QN AUTO: 28.4 PG (ref 27–33)
MCHC RBC AUTO-ENTMCNC: 31.6 G/DL (ref 32–36)
MCV RBC AUTO: 90 FL (ref 80–100)
MONOCYTES # BLD AUTO: 520 CELLS/UL (ref 200–950)
MONOCYTES NFR BLD AUTO: 8 %
NEUTROPHILS # BLD AUTO: 4108 CELLS/UL (ref 1500–7800)
NEUTROPHILS NFR BLD AUTO: 63.2 %
NONHDLC SERPL-MCNC: 171 MG/DL (CALC)
PLATELET # BLD AUTO: 287 THOUSAND/UL (ref 140–400)
PMV BLD REES-ECKER: 10.7 FL (ref 7.5–12.5)
POTASSIUM SERPL-SCNC: 4.2 MMOL/L (ref 3.5–5.3)
PROT SERPL-MCNC: 6.7 G/DL (ref 6.1–8.1)
RBC # BLD AUTO: 4.79 MILLION/UL (ref 3.8–5.1)
SODIUM SERPL-SCNC: 137 MMOL/L (ref 135–146)
TRIGL SERPL-MCNC: 107 MG/DL
WBC # BLD AUTO: 6.5 THOUSAND/UL (ref 3.8–10.8)

## 2025-06-17 ENCOUNTER — OFFICE VISIT (OUTPATIENT)
Dept: FAMILY MEDICINE | Facility: CLINIC | Age: 50
End: 2025-06-17
Payer: COMMERCIAL

## 2025-06-17 VITALS
BODY MASS INDEX: 31.36 KG/M2 | WEIGHT: 177 LBS | HEART RATE: 75 BPM | TEMPERATURE: 98 F | DIASTOLIC BLOOD PRESSURE: 84 MMHG | HEIGHT: 63 IN | OXYGEN SATURATION: 95 % | SYSTOLIC BLOOD PRESSURE: 120 MMHG

## 2025-06-17 DIAGNOSIS — D64.9 ANEMIA, UNSPECIFIED TYPE: Primary | ICD-10-CM

## 2025-06-17 DIAGNOSIS — G43.109 MIGRAINE WITH AURA AND WITHOUT STATUS MIGRAINOSUS, NOT INTRACTABLE: ICD-10-CM

## 2025-06-17 DIAGNOSIS — K21.9 GASTROESOPHAGEAL REFLUX DISEASE, UNSPECIFIED WHETHER ESOPHAGITIS PRESENT: ICD-10-CM

## 2025-06-17 DIAGNOSIS — F41.9 ANXIETY: ICD-10-CM

## 2025-06-17 DIAGNOSIS — F32.81 PMDD (PREMENSTRUAL DYSPHORIC DISORDER): ICD-10-CM

## 2025-06-17 DIAGNOSIS — K31.7 GASTRIC POLYP: ICD-10-CM

## 2025-06-17 DIAGNOSIS — Z12.31 BREAST CANCER SCREENING BY MAMMOGRAM: ICD-10-CM

## 2025-06-17 PROBLEM — E78.5 HYPERLIPIDEMIA: Status: RESOLVED | Noted: 2018-09-28 | Resolved: 2025-06-17

## 2025-06-17 PROCEDURE — 99214 OFFICE O/P EST MOD 30 MIN: CPT | Mod: S$GLB,,, | Performed by: FAMILY MEDICINE

## 2025-06-17 PROCEDURE — 99999 PR PBB SHADOW E&M-EST. PATIENT-LVL IV: CPT | Mod: PBBFAC,,, | Performed by: FAMILY MEDICINE

## 2025-06-17 RX ORDER — VENLAFAXINE HYDROCHLORIDE 75 MG/1
75 CAPSULE, EXTENDED RELEASE ORAL DAILY
Qty: 90 CAPSULE | Refills: 1 | Status: SHIPPED | OUTPATIENT
Start: 2025-06-17

## 2025-06-17 RX ORDER — RABEPRAZOLE SODIUM 20 MG/1
20 TABLET, DELAYED RELEASE ORAL DAILY
COMMUNITY

## 2025-06-17 RX ORDER — SPIRONOLACTONE 25 MG/1
25 TABLET ORAL DAILY
Qty: 90 TABLET | Refills: 1 | Status: SHIPPED | OUTPATIENT
Start: 2025-06-17

## 2025-06-18 PROBLEM — F32.81 PMDD (PREMENSTRUAL DYSPHORIC DISORDER): Status: ACTIVE | Noted: 2025-06-18

## 2025-06-18 PROBLEM — K31.7 GASTRIC POLYP: Status: ACTIVE | Noted: 2025-06-18

## 2025-07-22 ENCOUNTER — HOSPITAL ENCOUNTER (OUTPATIENT)
Dept: RADIOLOGY | Facility: CLINIC | Age: 50
Discharge: HOME | End: 2025-07-22
Attending: FAMILY MEDICINE
Payer: COMMERCIAL

## 2025-07-22 DIAGNOSIS — Z12.31 ENCOUNTER FOR SCREENING MAMMOGRAM FOR BREAST CANCER: ICD-10-CM

## 2025-07-22 PROCEDURE — 77063 BREAST TOMOSYNTHESIS BI: CPT | Mod: 26,,, | Performed by: RADIOLOGY

## 2025-07-22 PROCEDURE — 77067 SCR MAMMO BI INCL CAD: CPT | Mod: 26,,, | Performed by: RADIOLOGY

## 2025-07-22 PROCEDURE — 77067 SCR MAMMO BI INCL CAD: CPT | Mod: TC,PO

## 2025-07-29 ENCOUNTER — HOSPITAL ENCOUNTER (OUTPATIENT)
Dept: RADIOLOGY | Facility: HOSPITAL | Age: 50
Discharge: HOME | End: 2025-07-29
Attending: FAMILY MEDICINE
Payer: COMMERCIAL

## 2025-07-29 DIAGNOSIS — R92.8 ABNORMAL MAMMOGRAM: ICD-10-CM

## 2025-07-29 PROCEDURE — 76642 ULTRASOUND BREAST LIMITED: CPT | Mod: 26,LT,, | Performed by: RADIOLOGY

## 2025-07-29 PROCEDURE — 77065 DX MAMMO INCL CAD UNI: CPT | Mod: 26,LT,, | Performed by: RADIOLOGY

## 2025-07-29 PROCEDURE — 77061 BREAST TOMOSYNTHESIS UNI: CPT | Mod: 26,LT,, | Performed by: RADIOLOGY

## 2025-07-29 PROCEDURE — 77061 BREAST TOMOSYNTHESIS UNI: CPT | Mod: TC,LT

## 2025-07-29 PROCEDURE — 76642 ULTRASOUND BREAST LIMITED: CPT | Mod: TC,LT

## (undated) DEVICE — SEE MEDLINE ITEM 146298

## (undated) DEVICE — UNDERGLOVE BIOGEL PI SZ 6.5 LF

## (undated) DEVICE — TUBING SUC UNIV W/CONN 12FT

## (undated) DEVICE — BLADE MEDIUM 9MM X 25MM

## (undated) DEVICE — DURAPREP SURG SCRUB 26ML

## (undated) DEVICE — SEE MEDLINE ITEM 157131

## (undated) DEVICE — DRAPE STERI U-SHAPED 47X51IN

## (undated) DEVICE — ELECTRODE REM PLYHSV RETURN 9

## (undated) DEVICE — BLADE SAW SM OSC SAGITTAL .39M

## (undated) DEVICE — ALCOHOL 70% ISOP RUBBING 4OZ

## (undated) DEVICE — SEE MEDLINE ITEM 157117

## (undated) DEVICE — PENCIL ROCKER SWITCH 10FT CORD

## (undated) DEVICE — NDL 22GA X1 1/2 REG BEVEL

## (undated) DEVICE — GLOVE SURGICAL LATEX SZ 6.5

## (undated) DEVICE — DRAPE C-ARM FOR MOBILE XRAY

## (undated) DEVICE — SUT FIBERWIRE #5 1/2 X 38

## (undated) DEVICE — SUT MONOCRYL 2-0 S UND

## (undated) DEVICE — BLADE SURG #15 CARBON STEEL

## (undated) DEVICE — PADDING CAST 4IN SPECIALIST

## (undated) DEVICE — SUT 2/0 36IN ETHIBOND EXCE

## (undated) DEVICE — BANDAGE ESMARK 6X12

## (undated) DEVICE — DRAPE PLASTIC U 60X72

## (undated) DEVICE — SOL 9P NACL IRR PIC IL

## (undated) DEVICE — GAUZE SPONGE 4X4 12PLY